# Patient Record
Sex: MALE | Race: ASIAN | Employment: OTHER | URBAN - METROPOLITAN AREA
[De-identification: names, ages, dates, MRNs, and addresses within clinical notes are randomized per-mention and may not be internally consistent; named-entity substitution may affect disease eponyms.]

---

## 2017-01-10 PROBLEM — R35.1 NOCTURIA: Status: ACTIVE | Noted: 2017-01-10

## 2017-01-10 PROBLEM — R39.15 URINARY URGENCY: Status: ACTIVE | Noted: 2017-01-10

## 2017-01-10 PROBLEM — N32.81 OAB (OVERACTIVE BLADDER): Status: ACTIVE | Noted: 2017-01-10

## 2017-01-10 PROBLEM — R35.0 URINARY FREQUENCY: Status: ACTIVE | Noted: 2017-01-10

## 2017-01-28 ENCOUNTER — APPOINTMENT (OUTPATIENT)
Dept: LAB | Age: 72
End: 2017-01-28
Attending: INTERNAL MEDICINE
Payer: MEDICAID

## 2017-01-28 DIAGNOSIS — N18.5 CHRONIC KIDNEY DISEASE, STAGE V (HCC): ICD-10-CM

## 2017-01-28 LAB
ALBUMIN SERPL BCP-MCNC: 3.6 G/DL (ref 3.5–4.8)
ANION GAP SERPL CALC-SCNC: 6 MMOL/L (ref 0–18)
BUN SERPL-MCNC: 45 MG/DL (ref 8–20)
BUN/CREAT SERPL: 8.7 (ref 10–20)
CALCIUM SERPL-MCNC: 8.9 MG/DL (ref 8.5–10.5)
CHLORIDE SERPL-SCNC: 113 MMOL/L (ref 95–110)
CO2 SERPL-SCNC: 23 MMOL/L (ref 22–32)
CREAT SERPL-MCNC: 5.15 MG/DL (ref 0.5–1.5)
GLUCOSE SERPL-MCNC: 165 MG/DL (ref 70–99)
HCT VFR BLD AUTO: 27.6 % (ref 41–52)
HGB BLD-MCNC: 8.9 G/DL (ref 13.5–17.5)
OSMOLALITY UR CALC.SUM OF ELEC: 309 MOSM/KG (ref 275–295)
PHOSPHATE SERPL-MCNC: 4.1 MG/DL (ref 2.4–4.7)
POTASSIUM SERPL-SCNC: 5 MMOL/L (ref 3.3–5.1)
SODIUM SERPL-SCNC: 142 MMOL/L (ref 136–144)

## 2017-01-28 PROCEDURE — 85014 HEMATOCRIT: CPT

## 2017-01-28 PROCEDURE — 36415 COLL VENOUS BLD VENIPUNCTURE: CPT

## 2017-01-28 PROCEDURE — 80069 RENAL FUNCTION PANEL: CPT

## 2017-01-28 PROCEDURE — 85018 HEMOGLOBIN: CPT

## 2017-02-20 ENCOUNTER — LAB ENCOUNTER (OUTPATIENT)
Dept: LAB | Age: 72
End: 2017-02-20
Attending: INTERNAL MEDICINE
Payer: MEDICAID

## 2017-02-20 DIAGNOSIS — E78.5 HYPERLIPIDEMIA: ICD-10-CM

## 2017-02-20 DIAGNOSIS — D63.1 ANEMIA IN CHRONIC KIDNEY DISEASE(285.21): ICD-10-CM

## 2017-02-20 DIAGNOSIS — E87.5 HYPERKALEMIA: ICD-10-CM

## 2017-02-20 DIAGNOSIS — N18.9 ANEMIA IN CHRONIC KIDNEY DISEASE(285.21): ICD-10-CM

## 2017-02-20 DIAGNOSIS — E11.21 TYPE II DIABETES MELLITUS WITH NEPHROPATHY (HCC): ICD-10-CM

## 2017-02-20 DIAGNOSIS — E87.2 METABOLIC ACIDOSIS: ICD-10-CM

## 2017-02-20 DIAGNOSIS — N18.5 CHRONIC KIDNEY DISEASE (CKD), STAGE 5: Primary | ICD-10-CM

## 2017-02-20 LAB
ANION GAP SERPL CALC-SCNC: 7 MMOL/L (ref 0–18)
BUN SERPL-MCNC: 41 MG/DL (ref 8–20)
BUN/CREAT SERPL: 7.7 (ref 10–20)
CALCIUM SERPL-MCNC: 9 MG/DL (ref 8.5–10.5)
CHLORIDE SERPL-SCNC: 116 MMOL/L (ref 95–110)
CO2 SERPL-SCNC: 21 MMOL/L (ref 22–32)
CREAT SERPL-MCNC: 5.3 MG/DL (ref 0.5–1.5)
FERRITIN SERPL IA-MCNC: 94 NG/ML (ref 24–336)
GLUCOSE SERPL-MCNC: 48 MG/DL (ref 70–99)
HCT VFR BLD AUTO: 27.6 % (ref 41–52)
HGB BLD-MCNC: 8.9 G/DL (ref 13.5–17.5)
IRON SATN MFR SERPL: 22 % (ref 20–55)
IRON SERPL-MCNC: 47 MCG/DL (ref 45–182)
OSMOLALITY UR CALC.SUM OF ELEC: 305 MOSM/KG (ref 275–295)
POTASSIUM SERPL-SCNC: 5.6 MMOL/L (ref 3.3–5.1)
SODIUM SERPL-SCNC: 144 MMOL/L (ref 136–144)
TIBC SERPL-MCNC: 215 MCG/DL (ref 228–428)
TRANSFERRIN SERPL-MCNC: 163 MG/DL (ref 180–329)

## 2017-02-20 PROCEDURE — 85018 HEMOGLOBIN: CPT

## 2017-02-20 PROCEDURE — 84466 ASSAY OF TRANSFERRIN: CPT

## 2017-02-20 PROCEDURE — 80048 BASIC METABOLIC PNL TOTAL CA: CPT

## 2017-02-20 PROCEDURE — 82728 ASSAY OF FERRITIN: CPT

## 2017-02-20 PROCEDURE — 36415 COLL VENOUS BLD VENIPUNCTURE: CPT

## 2017-02-20 PROCEDURE — 83540 ASSAY OF IRON: CPT

## 2017-02-20 PROCEDURE — 85014 HEMATOCRIT: CPT

## 2017-02-25 ENCOUNTER — APPOINTMENT (OUTPATIENT)
Dept: LAB | Age: 72
End: 2017-02-25
Attending: INTERNAL MEDICINE
Payer: MEDICAID

## 2017-02-25 DIAGNOSIS — E87.5 HYPERKALEMIA: ICD-10-CM

## 2017-02-25 LAB — POTASSIUM SERPL-SCNC: 5.5 MMOL/L (ref 3.3–5.1)

## 2017-02-25 PROCEDURE — 36415 COLL VENOUS BLD VENIPUNCTURE: CPT

## 2017-02-25 PROCEDURE — 84132 ASSAY OF SERUM POTASSIUM: CPT

## 2017-03-09 ENCOUNTER — APPOINTMENT (OUTPATIENT)
Dept: LAB | Age: 72
End: 2017-03-09
Attending: INTERNAL MEDICINE
Payer: MEDICAID

## 2017-03-09 DIAGNOSIS — E87.5 HYPERKALEMIA: ICD-10-CM

## 2017-03-09 LAB — POTASSIUM SERPL-SCNC: 4.1 MMOL/L (ref 3.3–5.1)

## 2017-03-09 PROCEDURE — 84132 ASSAY OF SERUM POTASSIUM: CPT

## 2017-03-09 PROCEDURE — 36415 COLL VENOUS BLD VENIPUNCTURE: CPT

## 2017-04-01 ENCOUNTER — APPOINTMENT (OUTPATIENT)
Dept: LAB | Age: 72
End: 2017-04-01
Attending: INTERNAL MEDICINE
Payer: MEDICAID

## 2017-04-01 DIAGNOSIS — N18.9 ANEMIA IN CKD (CHRONIC KIDNEY DISEASE): ICD-10-CM

## 2017-04-01 DIAGNOSIS — D63.1 ANEMIA IN CKD (CHRONIC KIDNEY DISEASE): ICD-10-CM

## 2017-04-01 DIAGNOSIS — N18.5 CHRONIC KIDNEY DISEASE, STAGE V (HCC): ICD-10-CM

## 2017-04-01 PROCEDURE — 85014 HEMATOCRIT: CPT

## 2017-04-01 PROCEDURE — 36415 COLL VENOUS BLD VENIPUNCTURE: CPT

## 2017-04-01 PROCEDURE — 85018 HEMOGLOBIN: CPT

## 2017-04-01 PROCEDURE — 80069 RENAL FUNCTION PANEL: CPT

## 2017-04-05 PROBLEM — N18.5 CHRONIC KIDNEY DISEASE, STAGE V (HCC): Status: ACTIVE | Noted: 2017-04-05

## 2017-04-05 PROBLEM — N18.9 ANEMIA IN CHRONIC KIDNEY DISEASE(285.21): Status: ACTIVE | Noted: 2017-04-05

## 2017-04-05 PROBLEM — E87.2 METABOLIC ACIDOSIS: Status: ACTIVE | Noted: 2017-04-05

## 2017-04-05 PROBLEM — E87.20 METABOLIC ACIDOSIS: Status: ACTIVE | Noted: 2017-04-05

## 2017-04-05 PROBLEM — D63.1 ANEMIA IN CHRONIC KIDNEY DISEASE(285.21): Status: ACTIVE | Noted: 2017-04-05

## 2017-04-12 ENCOUNTER — TELEPHONE (OUTPATIENT)
Dept: HEMATOLOGY/ONCOLOGY | Facility: HOSPITAL | Age: 72
End: 2017-04-12

## 2017-04-13 ENCOUNTER — APPOINTMENT (OUTPATIENT)
Dept: GENERAL RADIOLOGY | Facility: HOSPITAL | Age: 72
DRG: 233 | End: 2017-04-13
Attending: EMERGENCY MEDICINE
Payer: MEDICAID

## 2017-04-13 ENCOUNTER — HOSPITAL ENCOUNTER (INPATIENT)
Facility: HOSPITAL | Age: 72
LOS: 14 days | Discharge: NURSING, REHAB OR LONG-TERM CARE | DRG: 233 | End: 2017-04-28
Attending: EMERGENCY MEDICINE | Admitting: INTERNAL MEDICINE
Payer: MEDICAID

## 2017-04-13 DIAGNOSIS — I25.110 ATHEROSCLEROSIS OF NATIVE CORONARY ARTERY OF NATIVE HEART WITH UNSTABLE ANGINA PECTORIS (HCC): Primary | ICD-10-CM

## 2017-04-13 PROCEDURE — 96375 TX/PRO/DX INJ NEW DRUG ADDON: CPT

## 2017-04-13 PROCEDURE — 80061 LIPID PANEL: CPT | Performed by: EMERGENCY MEDICINE

## 2017-04-13 PROCEDURE — 93005 ELECTROCARDIOGRAM TRACING: CPT

## 2017-04-13 PROCEDURE — 85025 COMPLETE CBC W/AUTO DIFF WBC: CPT | Performed by: EMERGENCY MEDICINE

## 2017-04-13 PROCEDURE — 93010 ELECTROCARDIOGRAM REPORT: CPT | Performed by: EMERGENCY MEDICINE

## 2017-04-13 PROCEDURE — 71020 XR CHEST PA + LAT CHEST (CPT=71020): CPT

## 2017-04-13 PROCEDURE — 85730 THROMBOPLASTIN TIME PARTIAL: CPT | Performed by: EMERGENCY MEDICINE

## 2017-04-13 PROCEDURE — 99285 EMERGENCY DEPT VISIT HI MDM: CPT

## 2017-04-13 PROCEDURE — 85610 PROTHROMBIN TIME: CPT | Performed by: EMERGENCY MEDICINE

## 2017-04-13 PROCEDURE — 96365 THER/PROPH/DIAG IV INF INIT: CPT

## 2017-04-13 PROCEDURE — 83880 ASSAY OF NATRIURETIC PEPTIDE: CPT | Performed by: EMERGENCY MEDICINE

## 2017-04-13 PROCEDURE — 80048 BASIC METABOLIC PNL TOTAL CA: CPT | Performed by: EMERGENCY MEDICINE

## 2017-04-13 PROCEDURE — 84484 ASSAY OF TROPONIN QUANT: CPT | Performed by: EMERGENCY MEDICINE

## 2017-04-13 RX ORDER — ISOSORBIDE MONONITRATE 20 MG/1
20 TABLET ORAL DAILY
COMMUNITY

## 2017-04-13 RX ORDER — LEVOTHYROXINE SODIUM 0.03 MG/1
25 TABLET ORAL
COMMUNITY

## 2017-04-13 RX ORDER — ESOMEPRAZOLE MAGNESIUM 40 MG/1
40 CAPSULE, DELAYED RELEASE ORAL
COMMUNITY

## 2017-04-13 RX ORDER — TAMSULOSIN HYDROCHLORIDE 0.4 MG/1
0.4 CAPSULE ORAL DAILY
COMMUNITY

## 2017-04-13 RX ORDER — FOLIC ACID/VIT B COMPLEX AND C 0.8 MG
0.8 TABLET ORAL DAILY
COMMUNITY

## 2017-04-13 RX ORDER — CARVEDILOL 6.25 MG/1
6.25 TABLET ORAL DAILY
Status: ON HOLD | COMMUNITY
End: 2017-04-28

## 2017-04-14 ENCOUNTER — APPOINTMENT (OUTPATIENT)
Dept: CV DIAGNOSTICS | Facility: HOSPITAL | Age: 72
DRG: 233 | End: 2017-04-14
Attending: INTERNAL MEDICINE
Payer: MEDICAID

## 2017-04-14 PROBLEM — N18.9 RENAL FAILURE (ARF), ACUTE ON CHRONIC (HCC): Status: ACTIVE | Noted: 2017-04-14

## 2017-04-14 PROBLEM — N17.9 RENAL FAILURE (ARF), ACUTE ON CHRONIC: Status: ACTIVE | Noted: 2017-04-14

## 2017-04-14 PROBLEM — R07.9 ACUTE CHEST PAIN: Status: ACTIVE | Noted: 2017-04-14

## 2017-04-14 PROBLEM — I50.9 ACUTE ON CHRONIC CONGESTIVE HEART FAILURE, UNSPECIFIED CONGESTIVE HEART FAILURE TYPE: Status: ACTIVE | Noted: 2017-04-14

## 2017-04-14 PROBLEM — N18.9 RENAL FAILURE (ARF), ACUTE ON CHRONIC: Status: ACTIVE | Noted: 2017-04-14

## 2017-04-14 PROBLEM — D64.9 ANEMIA: Status: ACTIVE | Noted: 2017-04-14

## 2017-04-14 PROBLEM — R73.9 HYPERGLYCEMIA: Status: ACTIVE | Noted: 2017-04-14

## 2017-04-14 PROBLEM — R06.09 DYSPNEA ON EXERTION: Status: ACTIVE | Noted: 2017-04-14

## 2017-04-14 PROBLEM — R06.00 DYSPNEA ON EXERTION: Status: ACTIVE | Noted: 2017-04-14

## 2017-04-14 PROBLEM — N17.9 RENAL FAILURE (ARF), ACUTE ON CHRONIC (HCC): Status: ACTIVE | Noted: 2017-04-14

## 2017-04-14 PROCEDURE — 93306 TTE W/DOPPLER COMPLETE: CPT

## 2017-04-14 PROCEDURE — 3E033PZ INTRODUCTION OF PLATELET INHIBITOR INTO PERIPHERAL VEIN, PERCUTANEOUS APPROACH: ICD-10-PCS | Performed by: INTERNAL MEDICINE

## 2017-04-14 PROCEDURE — 84484 ASSAY OF TROPONIN QUANT: CPT | Performed by: EMERGENCY MEDICINE

## 2017-04-14 PROCEDURE — 93005 ELECTROCARDIOGRAM TRACING: CPT

## 2017-04-14 PROCEDURE — 93010 ELECTROCARDIOGRAM REPORT: CPT | Performed by: EMERGENCY MEDICINE

## 2017-04-14 PROCEDURE — 82962 GLUCOSE BLOOD TEST: CPT

## 2017-04-14 PROCEDURE — 83036 HEMOGLOBIN GLYCOSYLATED A1C: CPT | Performed by: INTERNAL MEDICINE

## 2017-04-14 PROCEDURE — 93010 ELECTROCARDIOGRAM REPORT: CPT | Performed by: INTERNAL MEDICINE

## 2017-04-14 PROCEDURE — 84484 ASSAY OF TROPONIN QUANT: CPT | Performed by: INTERNAL MEDICINE

## 2017-04-14 PROCEDURE — 80048 BASIC METABOLIC PNL TOTAL CA: CPT | Performed by: INTERNAL MEDICINE

## 2017-04-14 PROCEDURE — 93306 TTE W/DOPPLER COMPLETE: CPT | Performed by: INTERNAL MEDICINE

## 2017-04-14 PROCEDURE — 85730 THROMBOPLASTIN TIME PARTIAL: CPT | Performed by: INTERNAL MEDICINE

## 2017-04-14 PROCEDURE — 85025 COMPLETE CBC W/AUTO DIFF WBC: CPT | Performed by: INTERNAL MEDICINE

## 2017-04-14 PROCEDURE — 80061 LIPID PANEL: CPT | Performed by: INTERNAL MEDICINE

## 2017-04-14 RX ORDER — FUROSEMIDE 10 MG/ML
40 INJECTION INTRAMUSCULAR; INTRAVENOUS ONCE
Status: COMPLETED | OUTPATIENT
Start: 2017-04-14 | End: 2017-04-14

## 2017-04-14 RX ORDER — LEVOTHYROXINE SODIUM 0.03 MG/1
25 TABLET ORAL
Status: DISCONTINUED | OUTPATIENT
Start: 2017-04-14 | End: 2017-04-28

## 2017-04-14 RX ORDER — NITROGLYCERIN 2.5 MG/D
1 PATCH TRANSDERMAL DAILY
Status: DISCONTINUED | OUTPATIENT
Start: 2017-04-14 | End: 2017-04-28

## 2017-04-14 RX ORDER — FUROSEMIDE 40 MG/1
40 TABLET ORAL
Status: DISCONTINUED | OUTPATIENT
Start: 2017-04-14 | End: 2017-04-25

## 2017-04-14 RX ORDER — CETIRIZINE HYDROCHLORIDE 5 MG/1
TABLET ORAL DAILY
COMMUNITY

## 2017-04-14 RX ORDER — EPTIFIBATIDE 0.75 MG/ML
1 INJECTION, SOLUTION INTRAVENOUS CONTINUOUS
Status: DISCONTINUED | OUTPATIENT
Start: 2017-04-14 | End: 2017-04-16

## 2017-04-14 RX ORDER — FOLIC ACID/VIT B COMPLEX AND C 0.8 MG
1 TABLET ORAL DAILY
Status: DISCONTINUED | OUTPATIENT
Start: 2017-04-14 | End: 2017-04-28

## 2017-04-14 RX ORDER — FUROSEMIDE 40 MG/1
40 TABLET ORAL DAILY
Status: DISCONTINUED | OUTPATIENT
Start: 2017-04-14 | End: 2017-04-14

## 2017-04-14 RX ORDER — HEPARIN SODIUM 1000 [USP'U]/ML
60 INJECTION, SOLUTION INTRAVENOUS; SUBCUTANEOUS ONCE
Status: DISCONTINUED | OUTPATIENT
Start: 2017-04-14 | End: 2017-04-14

## 2017-04-14 RX ORDER — PANTOPRAZOLE SODIUM 40 MG/1
40 TABLET, DELAYED RELEASE ORAL
Status: DISCONTINUED | OUTPATIENT
Start: 2017-04-14 | End: 2017-04-24

## 2017-04-14 RX ORDER — HEPARIN SODIUM AND DEXTROSE 10000; 5 [USP'U]/100ML; G/100ML
12 INJECTION INTRAVENOUS ONCE
Status: DISCONTINUED | OUTPATIENT
Start: 2017-04-14 | End: 2017-04-14

## 2017-04-14 RX ORDER — ASPIRIN 81 MG/1
324 TABLET, CHEWABLE ORAL ONCE
Status: COMPLETED | OUTPATIENT
Start: 2017-04-14 | End: 2017-04-14

## 2017-04-14 RX ORDER — AMLODIPINE BESYLATE 10 MG/1
10 TABLET ORAL DAILY
Status: DISCONTINUED | OUTPATIENT
Start: 2017-04-15 | End: 2017-04-28

## 2017-04-14 RX ORDER — ALFUZOSIN HYDROCHLORIDE 10 MG/1
10 TABLET, EXTENDED RELEASE ORAL
Status: DISCONTINUED | OUTPATIENT
Start: 2017-04-14 | End: 2017-04-28

## 2017-04-14 RX ORDER — DEXTROSE MONOHYDRATE 25 G/50ML
50 INJECTION, SOLUTION INTRAVENOUS AS NEEDED
Status: DISCONTINUED | OUTPATIENT
Start: 2017-04-14 | End: 2017-04-14

## 2017-04-14 RX ORDER — HEPARIN SODIUM AND DEXTROSE 10000; 5 [USP'U]/100ML; G/100ML
INJECTION INTRAVENOUS CONTINUOUS
Status: DISCONTINUED | OUTPATIENT
Start: 2017-04-14 | End: 2017-04-16

## 2017-04-14 RX ORDER — CARVEDILOL 6.25 MG/1
6.25 TABLET ORAL DAILY
Status: DISCONTINUED | OUTPATIENT
Start: 2017-04-14 | End: 2017-04-14

## 2017-04-14 RX ORDER — HEPARIN SODIUM AND DEXTROSE 10000; 5 [USP'U]/100ML; G/100ML
INJECTION INTRAVENOUS CONTINUOUS
Status: DISCONTINUED | OUTPATIENT
Start: 2017-04-14 | End: 2017-04-14

## 2017-04-14 RX ORDER — HEPARIN SODIUM 1000 [USP'U]/ML
60 INJECTION, SOLUTION INTRAVENOUS; SUBCUTANEOUS ONCE
Status: COMPLETED | OUTPATIENT
Start: 2017-04-14 | End: 2017-04-14

## 2017-04-14 RX ORDER — DEXTROSE MONOHYDRATE 25 G/50ML
50 INJECTION, SOLUTION INTRAVENOUS AS NEEDED
Status: DISCONTINUED | OUTPATIENT
Start: 2017-04-14 | End: 2017-04-28

## 2017-04-14 RX ORDER — CARVEDILOL 12.5 MG/1
12.5 TABLET ORAL DAILY
Status: DISCONTINUED | OUTPATIENT
Start: 2017-04-15 | End: 2017-04-25

## 2017-04-14 RX ORDER — HEPARIN SODIUM AND DEXTROSE 10000; 5 [USP'U]/100ML; G/100ML
12 INJECTION INTRAVENOUS ONCE
Status: COMPLETED | OUTPATIENT
Start: 2017-04-14 | End: 2017-04-14

## 2017-04-14 RX ORDER — SODIUM BICARBONATE 650 MG/1
600 TABLET ORAL 3 TIMES DAILY
Status: DISCONTINUED | OUTPATIENT
Start: 2017-04-14 | End: 2017-04-18

## 2017-04-14 NOTE — PLAN OF CARE
CARDIOVASCULAR - ADULT    • Maintains optimal cardiac output and hemodynamic stability Progressing        PAIN - ADULT    • Verbalizes/displays adequate comfort level or patient's stated pain goal Progressing        Patient Centered Care    • Patient prefe

## 2017-04-14 NOTE — CONSULTS
[facility]    Report of Consultation    Can Parnell Patient Status:  Inpatient    10/17/1945 MRN [id number]   Location [facility] 3W/SW Attending  [address] Day # 1 PCP Verneita Lennox, MD, MD     Date of Admission 25 mcg 25 mcg Oral Before breakfast   NEPHRO-LIZA (NEPHRO-LIZA) tab 0.8 mg 1 tablet Oral Daily   sodium bicarbonate tab 650 mg Oral TID   Alfuzosin HCl ER (UROXATRAL) 24 hr tab 10 mg 10 mg Oral Daily with breakfast   nitroGLYCERIN (NITRODUR) 0.1 MG/HR 1 pa Wt Readings from Last 1 Encounters:  04/14/17 : 144 lb (65.318 kg)      General appearance: alert, appears stated age , no distress  Pulmonary:  clear to auscultation bilaterally  Cardiovascular: S1, S2 normal  Abdominal:  soft, non-tender  Extremities

## 2017-04-14 NOTE — ED INITIAL ASSESSMENT (HPI)
Pt c/o sob for the past couple of days. Pt has a history of kidney problems and has been having his Hgb monitored.

## 2017-04-14 NOTE — ED PROVIDER NOTES
Patient Seen in: [facility] AND [facility] Emergency Department    History   Patient presents with:  Dyspnea DEEPA SOB (respiratory)    Stated Complaint: DEEPA    HPI    64-year-old male presents the emergency department with 10 days of worsening dyspnea.   He denies otherwise stated in HPI.     Physical Exam       ED Triage Vitals   BP 04/13/17 2302 147/75 mmHg   Pulse 04/13/17 2302 88   Resp 04/13/17 2302 20   Temp 04/13/17 2302 98.1 °F (36.7 °C)   Temp src --    SpO2 04/13/17 2302 100 %   O2 Device 04/14/17 0011 None following:     Non HDL Chol 152 (*)     LDL Cholesterol 124 (*)     All other components within normal limits   BNP (B TYPE NATRIUERTIC PEPTIDE) - Abnormal; Notable for the following:     Beta Natriuretic Peptide 754 (*)     All other components within nor heparin started in ED. D/w Dr Toby Jones and Dr Vannessa Curry. Admit to tele.      Disposition and Plan     Clinical Impression:  Acute chest pain  (primary encounter diagnosis)  Acute on chronic congestive heart failure, unspecified congestive heart failure type ([facility] [address]

## 2017-04-14 NOTE — PAYOR COMM NOTE
[facility] [place] [facility]            (For Outpatient Use Only)  Initial Admit Date:  4/13/2017    Inpt/Obs Admit Date:  Inpt: 4/14/17 / Obs: N/A    Discharge Date:  [address]:   [de-identified]   MRN:  [id number]    CSN:  [id number]      Subscriber Name:    Subscriber :      Subscriber ID:    Pt Rel to Subscriber:      TERTIARY INSURANCE    Payor:    Plan:      Group Number:    Insurance Type:      Subscriber Name:    Subscriber :      Subscriber ID:    Pt Rel to Subscriber:      Hos HISTORY OF PRESENT ILLNESS:  Information was obtained from the patient as well as son who is at the bedside who stated that the patient on the day of admission noticed heartburn on the right side of the chest, which lasted for an hour and a half to 2 hours GENERAL:  The patient is lying in the bed, head raised position without any acute distress. VITAL SIGNS:  Blood pressure 110/74, pulse is 90 per minute and regular, respirations are 18, afebrile. HEENT:  Unremarkable.   NECK:  Supple without any carotid b [facility] 3W/SW  [address], [facility] [address] Day #  1  PCP  Jackson Tinsley MD, MD        Subjective:      Constitutional: Negative for fever and fatigue.    HENT: Negative for facial swelling.    Respiratory: Negative for choking a PTT  91.5*  04/14/2017    INR  1.1  04/13/2017    PHOS  4.4  04/01/2017    TROP  1.25*  04/14/2017        Xr Chest Pa + Lat Chest (cpt=71020)    4/14/2017  CONCLUSION:         1. Borderline cardiomegaly. 2. Atherosclerosis. 3. Scarring/atelectasis.  4. Mini Cardiovascular: Regular rhythm.     No murmur heard. Pulmonary/Chest: Breath sounds normal. He has no wheezes. Abdominal: Bowel sounds are normal. There is no rebound and no guarding. Neurological: He is oriented to person, place, and time.    Skin: Sk   10/17/1945  MRN  [id number]    [facility] 3W/SW  [address] Day #  1  PCP  Ilana Estrada MD, MD      Date of Admission:  2017  Date of Consult:  17  Reason for Consultation:      Patient with advanc NEPHRO-LIZA (NEPHRO-LIZA) tab 0.8 mg  1 tablet  Oral  Daily    sodium bicarbonate tab  650 mg  Oral  TID    Alfuzosin HCl ER (UROXATRAL) 24 hr tab 10 mg  10 mg  Oral  Daily with breakfast    nitroGLYCERIN (NITRODUR) 0.1 MG/HR 1 patch  1 patch  [employer]   Gross per 24 hour    Intake     160 ml    Output       0 ml    Net     160 ml      Wt Readings from Last 1 Encounters:  04/14/17 : 144 lb (65.318 kg)      General appearance: alert, appears stated age , no distress  Pulmonary:  clear to auscultation bila Pepito Batres #[id number]  (76 year old M)       [facility] [id number]         Bulmaro Grijalva MD Physician Signed  ED Provider Notes 4/13/2017 11:54 PM      Expand All Collapse All      Patient Seen in: [facility] Emergency Department      Hi Other systems are as noted in HPI. Constitutional and vital signs reviewed.      All other systems reviewed and negative except as noted above. PSFH elements reviewed from today and agreed except as otherwise stated in HPI.       Physical Exam        ED   GFR, Non-  10 (*)        GFR, -American  12 (*)        All other components within normal limits    TROPONIN I, 0 HOUR - Abnormal; Notable for the following:       Troponin  1.07 (*)        All other components within normal limits Mild diffuse interstitial prominence, which could be chronic, but can also be seen with mild pulmonary edema or atypical infection. Cardiac/mediastinal silhouette is within normal limits.       Radiology exams  Viewed and reviewed by myself and findings di

## 2017-04-14 NOTE — PLAN OF CARE
Altered Communication/Language Barrier    • Patient/Family is able to understand and participate in their care Progressing    Patient speaks some english, primary language is Occitan. Son, Reshma Urena, translates for patient.      CARDIOVASCULAR - ADULT    • Maintain

## 2017-04-14 NOTE — H&P
[facility]    PATIENT'S NAME: Mckenzie Somers   ATTENDING PHYSICIAN: Tyler Vergara MD   PATIENT ACCOUNT#:   [id number]    LOCATION:  [address] [place] [address] RECORD #:   [id number]       YOB: 1945  ADMISSION DATE:       04/13/2 he never smoked cigarettes or drank alcohol or used any drugs. The patient is ambulatory and uses a walker sometimes, and lately has been short of breath.     REVIEW OF SYSTEMS:  The patient denied severe headache, double vision, sore throat, swallowing di

## 2017-04-14 NOTE — PROGRESS NOTES
[place] [facility]    Progress Note    Can Parnell Patient Status:  Inpatient    10/17/1945 MRN [id number]   Location [facility] 3W/SW Attending [address] Day # 1 PCP Verneita Lennox, MD, MD     Subjective:     Constitu CA 8.7 04/14/2017   ALB 3.5 04/01/2017   PTT 91.5* 04/14/2017   INR 1.1 04/13/2017   PHOS 4.4 04/01/2017   TROP 1.25* 04/14/2017       Xr Chest Pa + Lat Chest (cpt=71020)    4/14/2017  CONCLUSION:  1. Borderline cardiomegaly. 2. Atherosclerosis.  3. Scarr

## 2017-04-14 NOTE — CONSULTS
[facility]    PATIENT'S NAME: Juhi Herberth   ATTENDING PHYSICIAN: Billy Alex MD   CONSULTING PHYSICIAN: Mardene Goodpasture, MD   PATIENT ACCOUNT#:   [id number]    LOCATION:  [address] [place] [address] RECORD #:   [id number]       DATE OF BIRTH:  1 respiratory distress at the time of my interview. VITAL SIGNS:  Temperature 98.2, heart rate 70, blood pressure 143/66, O2 saturation 100% on room air. HEENT:  Head:  Atraumatic, normocephalic.   Pupils equal, round, and reactive to light and accommodatio obtain echocardiogram to evaluate ejection fraction ans wall mothion. We may consider Lexiscan stress test in 2-3 days. Patient has already been evaluated by [facility]. Thank you so much for the consultation.   We will continue to follow up with yo

## 2017-04-15 PROCEDURE — 82962 GLUCOSE BLOOD TEST: CPT

## 2017-04-15 PROCEDURE — 80061 LIPID PANEL: CPT | Performed by: INTERNAL MEDICINE

## 2017-04-15 PROCEDURE — 85730 THROMBOPLASTIN TIME PARTIAL: CPT | Performed by: INTERNAL MEDICINE

## 2017-04-15 PROCEDURE — 84100 ASSAY OF PHOSPHORUS: CPT | Performed by: INTERNAL MEDICINE

## 2017-04-15 PROCEDURE — 85025 COMPLETE CBC W/AUTO DIFF WBC: CPT | Performed by: INTERNAL MEDICINE

## 2017-04-15 PROCEDURE — 80053 COMPREHEN METABOLIC PANEL: CPT | Performed by: INTERNAL MEDICINE

## 2017-04-15 PROCEDURE — 84443 ASSAY THYROID STIM HORMONE: CPT | Performed by: INTERNAL MEDICINE

## 2017-04-15 RX ORDER — ATORVASTATIN CALCIUM 40 MG/1
40 TABLET, FILM COATED ORAL NIGHTLY
Status: DISCONTINUED | OUTPATIENT
Start: 2017-04-15 | End: 2017-04-28

## 2017-04-15 RX ORDER — 0.9 % SODIUM CHLORIDE 0.9 %
VIAL (ML) INJECTION
Status: COMPLETED
Start: 2017-04-15 | End: 2017-04-15

## 2017-04-15 NOTE — PLAN OF CARE
Altered Communication/Language Barrier    • Patient/Family is able to understand and participate in their care Progressing    Primary language is Kyrgyz. Speaks basic [address] Son, Nika Silverio, translates when needed.      CARDIOVASCULAR - ADULT    • Maintains opti

## 2017-04-15 NOTE — PROGRESS NOTES
[facility]    Progress Note    Mary Ly Patient Status:  Inpatient    10/17/1945 MRN [id number]   Location [facility] 3W/SW Attending [address] Day # 2 PCP Jose Luis Ma MD, MD       Subjective:   Shaun Bobby 20*   --   22  23          Xr Chest Pa + Lat Chest (cpt=71020)    4/14/2017  CONCLUSION:  1. Borderline cardiomegaly. 2. Atherosclerosis. 3. Scarring/atelectasis. 4. Minimal scoliosis.  5. A preliminary report was submitted and there are no major discrepanc

## 2017-04-15 NOTE — PROGRESS NOTES
Patient seen in follow up. No overnight events. Patient denies any chest pain or sob. Denies any cough. Denies abdominal pain.      04/15/17  0808   BP: 131/55   Pulse: 66   Temp: 98.4 °F (36.9 °C)   Resp: 19       Intake/Output Summary (Last 24 hours carvedilol 6.25 MG Oral Tab Take 6.25 mg by mouth daily. isosorbide mononitrate 20 MG Oral Tab Take 20 mg by mouth daily.    Levothyroxine Sodium 25 MCG Oral Tab Take 25 mcg by mouth before breakfast.   NEPHRO-LIZA 0.8 MG Oral Tab Take 0.8 mg by mouth jorge a Non-ST elevation myocardial infarction:  Patient has elevated troponin with EKG changes, definitely concerning for coronary artery disease.  I spoke with the patient in detail and with his son about my recommendation.  Patient needs to have coronary angiog

## 2017-04-16 PROCEDURE — 82728 ASSAY OF FERRITIN: CPT | Performed by: INTERNAL MEDICINE

## 2017-04-16 PROCEDURE — 84100 ASSAY OF PHOSPHORUS: CPT | Performed by: INTERNAL MEDICINE

## 2017-04-16 PROCEDURE — 85025 COMPLETE CBC W/AUTO DIFF WBC: CPT | Performed by: INTERNAL MEDICINE

## 2017-04-16 PROCEDURE — 80053 COMPREHEN METABOLIC PANEL: CPT | Performed by: INTERNAL MEDICINE

## 2017-04-16 PROCEDURE — 83540 ASSAY OF IRON: CPT | Performed by: INTERNAL MEDICINE

## 2017-04-16 PROCEDURE — 82962 GLUCOSE BLOOD TEST: CPT

## 2017-04-16 PROCEDURE — 84466 ASSAY OF TRANSFERRIN: CPT | Performed by: INTERNAL MEDICINE

## 2017-04-16 PROCEDURE — 85730 THROMBOPLASTIN TIME PARTIAL: CPT | Performed by: INTERNAL MEDICINE

## 2017-04-16 NOTE — PLAN OF CARE
Altered Communication/Language Barrier    • Patient/Family is able to understand and participate in their care Progressing    English second language     CARDIOVASCULAR - ADULT    • Maintains optimal cardiac output and hemodynamic stability Progressing

## 2017-04-16 NOTE — PLAN OF CARE
Problem: Diabetes/Glucose Control  Goal: Glucose maintained within prescribed range  INTERVENTIONS:  - Monitor Blood Glucose as ordered  - Assess for signs and symptoms of hyperglycemia and hypoglycemia  - Administer ordered medications to maintain glucose optimize hemodynamic stability  - Monitor arterial and/or venous puncture sites for bleeding and/or hematoma  - Assess quality of pulses, skin color and temperature  - Assess for signs of decreased coronary artery perfusion - ex.  Angina  - Evaluate fluid b anxiety  - Utilize distraction and/or relaxation techniques  - Monitor for opioid side effects  - Notify MD/LIP if interventions unsuccessful or patient reports new pain   Outcome: Progressing    Problem: SAFETY ADULT - FALL  Goal: Free from fall injury  I

## 2017-04-16 NOTE — PROGRESS NOTES
[facility]    Progress Note    Mary Macias Patient Status:  Inpatient    10/17/1945 MRN [id number]   Location [facility] 3W/SW Attending [address] Day # 3 PCP Jose Luis Ma MD, MD       Subjective:   Shaun Bobby Wendy Pizarro MD  4/16/2017

## 2017-04-16 NOTE — PROGRESS NOTES
[facility]    Progress Note    Julien Abdalla Patient Status:  Inpatient    10/17/1945 MRN [id number]   Location [facility] 3W/SW Attending [address] Day # 2 PCP Sandrita Alfred MD, MD     Subjective:   Subjective Results:     Lab Results  Component Value Date   WBC 7.5 04/15/2017   HGB 7.4* 04/15/2017   HCT 22.4* 04/15/2017    04/15/2017   CREATSERUM 5.59* 04/15/2017   BUN 52* 04/15/2017    04/15/2017   K 5.1 04/15/2017    04/15/2017

## 2017-04-16 NOTE — PROGRESS NOTES
Patient seen in follow up. No overnight events. Patient denies any chest pain or sob. Denies any cough. Denies abdominal pain.   C/o nasal bleed   04/16/17  0800   BP: 115/55   Pulse: 58   Temp: 98.3 °F (36.8 °C)   Resp: 17       Intake/Output [place] carvedilol 6.25 MG Oral Tab Take 6.25 mg by mouth daily. isosorbide mononitrate 20 MG Oral Tab Take 20 mg by mouth daily.    Levothyroxine Sodium 25 MCG Oral Tab Take 25 mcg by mouth before breakfast.   NEPHRO-LIZA 0.8 MG Oral Tab Take 0.8 mg by mouth jorge a Non-ST elevation myocardial infarction:  Patient has elevated troponin with EKG changes, definitely concerning for coronary artery disease.  I spoke with the patient in detail and with his son about my recommendation.  Patient needs to have coronary angiog

## 2017-04-17 ENCOUNTER — APPOINTMENT (OUTPATIENT)
Dept: CV DIAGNOSTICS | Facility: HOSPITAL | Age: 72
DRG: 233 | End: 2017-04-17
Attending: INTERNAL MEDICINE
Payer: MEDICAID

## 2017-04-17 ENCOUNTER — APPOINTMENT (OUTPATIENT)
Dept: NUCLEAR MEDICINE | Facility: HOSPITAL | Age: 72
DRG: 233 | End: 2017-04-17
Attending: INTERNAL MEDICINE
Payer: MEDICAID

## 2017-04-17 PROCEDURE — 80069 RENAL FUNCTION PANEL: CPT | Performed by: INTERNAL MEDICINE

## 2017-04-17 PROCEDURE — 93017 CV STRESS TEST TRACING ONLY: CPT

## 2017-04-17 PROCEDURE — 84484 ASSAY OF TROPONIN QUANT: CPT | Performed by: INTERNAL MEDICINE

## 2017-04-17 PROCEDURE — 78451 HT MUSCLE IMAGE SPECT SING: CPT

## 2017-04-17 PROCEDURE — 85730 THROMBOPLASTIN TIME PARTIAL: CPT | Performed by: INTERNAL MEDICINE

## 2017-04-17 PROCEDURE — 85025 COMPLETE CBC W/AUTO DIFF WBC: CPT | Performed by: INTERNAL MEDICINE

## 2017-04-17 PROCEDURE — 82962 GLUCOSE BLOOD TEST: CPT

## 2017-04-17 PROCEDURE — 78452 HT MUSCLE IMAGE SPECT MULT: CPT

## 2017-04-17 RX ORDER — 0.9 % SODIUM CHLORIDE 0.9 %
VIAL (ML) INJECTION
Status: DISCONTINUED
Start: 2017-04-17 | End: 2017-04-17 | Stop reason: WASHOUT

## 2017-04-17 RX ORDER — 0.9 % SODIUM CHLORIDE 0.9 %
VIAL (ML) INJECTION
Status: COMPLETED
Start: 2017-04-17 | End: 2017-04-17

## 2017-04-17 NOTE — PROGRESS NOTES
Patient seen in follow up. No overnight events. Patient denies any chest pain or sob. Denies any cough. Denies abdominal pain.   C/o nasal bleed   04/17/17  0900   BP:    Pulse: 67   Temp:    Resp:        Intake/Output Summary (Last 24 hours) at 04/ carvedilol 6.25 MG Oral Tab Take 6.25 mg by mouth daily. isosorbide mononitrate 20 MG Oral Tab Take 20 mg by mouth daily.    Levothyroxine Sodium 25 MCG Oral Tab Take 25 mcg by mouth before breakfast.   NEPHRO-LIZA 0.8 MG Oral Tab Take 0.8 mg by mouth jorge a Non-ST elevation myocardial infarction:  Patient has elevated troponin with EKG changes, definitely concerning for coronary artery disease.  I spoke with the patient in detail and with his son about my recommendation.  Patient needs to have coronary angiog

## 2017-04-17 NOTE — IMAGING NOTE
Patient is here for Regadenoson stress test. Baseline ECG showed more inverted T waves in inferior leads( changed from the last 3 ECG done on this admission. Patient denies any chest discomfort at this time. Dr Zaida Tapia (cardiologist)called and he came in to r

## 2017-04-17 NOTE — PROGRESS NOTES
[place] [facility] - [facility]    Progress Note    Tonny Kong Patient Status:  Inpatient    10/17/1945 MRN [id number]   Location [facility] 3W/SW Attending  [address] Day # 4 PCP Radha Hernandez MD, MD       Subjective:   Yuriy Camera Lidia Ramirez MD  4/17/2017

## 2017-04-18 ENCOUNTER — APPOINTMENT (OUTPATIENT)
Dept: HEMATOLOGY/ONCOLOGY | Facility: HOSPITAL | Age: 72
End: 2017-04-18
Attending: INTERNAL MEDICINE
Payer: MEDICAID

## 2017-04-18 PROCEDURE — 80048 BASIC METABOLIC PNL TOTAL CA: CPT | Performed by: INTERNAL MEDICINE

## 2017-04-18 PROCEDURE — 86920 COMPATIBILITY TEST SPIN: CPT

## 2017-04-18 PROCEDURE — 82962 GLUCOSE BLOOD TEST: CPT

## 2017-04-18 PROCEDURE — 85018 HEMOGLOBIN: CPT | Performed by: INTERNAL MEDICINE

## 2017-04-18 PROCEDURE — 86901 BLOOD TYPING SEROLOGIC RH(D): CPT | Performed by: INTERNAL MEDICINE

## 2017-04-18 PROCEDURE — 86900 BLOOD TYPING SEROLOGIC ABO: CPT | Performed by: INTERNAL MEDICINE

## 2017-04-18 PROCEDURE — 86850 RBC ANTIBODY SCREEN: CPT | Performed by: INTERNAL MEDICINE

## 2017-04-18 RX ORDER — SODIUM POLYSTYRENE SULFONATE 15 G/60ML
30 SUSPENSION ORAL; RECTAL ONCE
Status: COMPLETED | OUTPATIENT
Start: 2017-04-18 | End: 2017-04-18

## 2017-04-18 RX ORDER — SODIUM CHLORIDE 9 MG/ML
INJECTION, SOLUTION INTRAVENOUS CONTINUOUS
Status: DISCONTINUED | OUTPATIENT
Start: 2017-04-19 | End: 2017-04-19

## 2017-04-18 RX ORDER — ASPIRIN 81 MG/1
324 TABLET, CHEWABLE ORAL ONCE
Status: COMPLETED | OUTPATIENT
Start: 2017-04-19 | End: 2017-04-19

## 2017-04-18 RX ORDER — SODIUM CHLORIDE 9 MG/ML
INJECTION, SOLUTION INTRAVENOUS CONTINUOUS
Status: DISCONTINUED | OUTPATIENT
Start: 2017-04-18 | End: 2017-04-18

## 2017-04-18 RX ORDER — ALBUMIN (HUMAN) 12.5 G/50ML
100 SOLUTION INTRAVENOUS AS NEEDED
Status: DISCONTINUED | OUTPATIENT
Start: 2017-04-19 | End: 2017-04-25

## 2017-04-18 RX ORDER — CHLORHEXIDINE GLUCONATE 4 G/100ML
30 SOLUTION TOPICAL
Status: COMPLETED | OUTPATIENT
Start: 2017-04-19 | End: 2017-04-19

## 2017-04-18 RX ORDER — 0.9 % SODIUM CHLORIDE 0.9 %
VIAL (ML) INJECTION
Status: COMPLETED
Start: 2017-04-18 | End: 2017-04-18

## 2017-04-18 NOTE — PROGRESS NOTES
[facility]    Progress Note    Ivana Mcdonald Patient Status:  Inpatient    10/17/1945 MRN [id number]   Location [facility] 3W/SW Attending [address] Day # 5 PCP Osmar Cabral MD, MD     Subjective:   Subjective 7.3* 04/17/2017   HCT 22.0* 04/17/2017    04/17/2017   CREATSERUM 5.93* 04/17/2017   BUN 61* 04/17/2017    04/17/2017   K 5.3* 04/17/2017    04/17/2017   CO2 23 04/17/2017   GLU 90 04/17/2017   CA 8.7 04/17/2017   ALB 3.2* 04/17/2017   A

## 2017-04-18 NOTE — PLAN OF CARE
HEMATOLOGIC - ADULT    • Maintains hematologic stability Not Progressing        METABOLIC/FLUID AND ELECTROLYTES - ADULT    • Electrolytes maintained within normal limits Not Progressing    • Hemodynamic stability and optimal renal function maintained Not

## 2017-04-18 NOTE — PROGRESS NOTES
Patient seen in follow up. No overnight events. Patient denies any chest pain or sob. Denies any cough. Denies abdominal pain.       04/18/17  0746   BP: 142/74   Pulse: 64   Temp: 98.1 °F (36.7 °C)   Resp: 16       Intake/Output Summary (Last 24 ho Calcium-Phosphorus-Vitamin D (CITRACAL +D3 OR) Take 1 tablet by mouth daily. carvedilol 6.25 MG Oral Tab Take 6.25 mg by mouth daily. isosorbide mononitrate 20 MG Oral Tab Take 20 mg by mouth daily.    Levothyroxine Sodium 25 MCG Oral Tab Take 25 mcg by Non-ST elevation myocardial infarction:  Patient has elevated troponin with EKG changes, definitely concerning for coronary artery disease.  I spoke with the patient in detail and with his son about my recommendation.  Patient needs to have coronary angiog

## 2017-04-18 NOTE — PLAN OF CARE
Dialysis scheduled for 4/19/17. Spoke with Lamona.  Inquired that HD will be planned for afternoon after scheduled angiogram at 10am

## 2017-04-18 NOTE — PROGRESS NOTES
[facility]    Progress Note    Tacey Alpers Patient Status:  Inpatient    10/17/1945 MRN [id number]   Location [facility] 3W/SW Attending  [address] Day # 5 PCP Jackson Tinsley MD, MD       Subjective:   Yoav Gannon 1.1 04/13/2017   TSH 2.81 04/15/2017   PHOS 4.9* 04/17/2017   TROP 0.72* 04/17/2017                   Pop Watt MD  4/18/2017

## 2017-04-18 NOTE — CARDIAC REHAB
Cardiac Rehab Phase I    Activity:  Distance   Assistance needed   Patient tolerated activity . Education:  Handouts provided and reviewed: [address] Diet: Healthy Cardiac diet reviewed.     Disease Process: Disease process rev

## 2017-04-18 NOTE — PROGRESS NOTES
[facility]    [facility] Note    Bairon Johns Patient Status:  Inpatient    10/17/1945 MRN [id number]   Location [facility] 3W/SW Attending [address] Day # 4 PCP Carlos Silverio MD, MD     Subjective:   Subjective 7.3* 04/17/2017   HCT 22.0* 04/17/2017    04/17/2017   CREATSERUM 5.93* 04/17/2017   BUN 61* 04/17/2017    04/17/2017   K 5.3* 04/17/2017    04/17/2017   CO2 23 04/17/2017   GLU 90 04/17/2017   CA 8.7 04/17/2017   ALB 3.2* 04/17/2017   A

## 2017-04-18 NOTE — PAYOR COMM NOTE
CLINICAL UPDATE    Johny Cuevas #[id number]  (76 year old M)       [facility] [id number]         Elisha Noble MD Physician Signed  Progress Notes 4/18/2017  8:52 AM      Expand All Collapse All          Patient seen in follow up. No overnight events. epoetin rosendo (EPOGEN,PROCRIT) 63641 UNIT/ML injection 10,000 Units  10,000 Units  Subcutaneous  Weekly    furosemide (LASIX) tab 40 mg  40 mg  Oral  BID (Diuretic)    AmLODIPine Besylate (NORVASC) tab 10 mg  10 mg  Oral  Daily    carvedilol (COREG) tab 12. Non-ST elevation myocardial infarction:  Patient has elevated troponin with EKG changes, definitely concerning for coronary artery disease.  I spoke with the patient in detail and with his son about my recommendation.  Patient needs to have coronary angiog

## 2017-04-19 ENCOUNTER — APPOINTMENT (OUTPATIENT)
Dept: INTERVENTIONAL RADIOLOGY/VASCULAR | Facility: HOSPITAL | Age: 72
DRG: 233 | End: 2017-04-19
Attending: INTERNAL MEDICINE
Payer: MEDICAID

## 2017-04-19 PROCEDURE — 86706 HEP B SURFACE ANTIBODY: CPT | Performed by: INTERNAL MEDICINE

## 2017-04-19 PROCEDURE — 93458 L HRT ARTERY/VENTRICLE ANGIO: CPT

## 2017-04-19 PROCEDURE — 93005 ELECTROCARDIOGRAM TRACING: CPT

## 2017-04-19 PROCEDURE — 80053 COMPREHEN METABOLIC PANEL: CPT | Performed by: INTERNAL MEDICINE

## 2017-04-19 PROCEDURE — 85610 PROTHROMBIN TIME: CPT | Performed by: INTERNAL MEDICINE

## 2017-04-19 PROCEDURE — 4A023N7 MEASUREMENT OF CARDIAC SAMPLING AND PRESSURE, LEFT HEART, PERCUTANEOUS APPROACH: ICD-10-PCS | Performed by: INTERNAL MEDICINE

## 2017-04-19 PROCEDURE — 93010 ELECTROCARDIOGRAM REPORT: CPT | Performed by: INTERNAL MEDICINE

## 2017-04-19 PROCEDURE — 85025 COMPLETE CBC W/AUTO DIFF WBC: CPT | Performed by: INTERNAL MEDICINE

## 2017-04-19 PROCEDURE — B2111ZZ FLUOROSCOPY OF MULTIPLE CORONARY ARTERIES USING LOW OSMOLAR CONTRAST: ICD-10-PCS | Performed by: INTERNAL MEDICINE

## 2017-04-19 PROCEDURE — 86704 HEP B CORE ANTIBODY TOTAL: CPT | Performed by: INTERNAL MEDICINE

## 2017-04-19 PROCEDURE — 5A1D60Z PERFORMANCE OF URINARY FILTRATION, MULTIPLE: ICD-10-PCS | Performed by: INTERNAL MEDICINE

## 2017-04-19 PROCEDURE — 90935 HEMODIALYSIS ONE EVALUATION: CPT

## 2017-04-19 PROCEDURE — 82962 GLUCOSE BLOOD TEST: CPT

## 2017-04-19 PROCEDURE — 84100 ASSAY OF PHOSPHORUS: CPT | Performed by: INTERNAL MEDICINE

## 2017-04-19 PROCEDURE — 80500 HEPATITIS B PROFILE: CPT | Performed by: INTERNAL MEDICINE

## 2017-04-19 PROCEDURE — 87340 HEPATITIS B SURFACE AG IA: CPT | Performed by: INTERNAL MEDICINE

## 2017-04-19 PROCEDURE — 83735 ASSAY OF MAGNESIUM: CPT | Performed by: INTERNAL MEDICINE

## 2017-04-19 RX ORDER — ASPIRIN 81 MG/1
81 TABLET, CHEWABLE ORAL DAILY
Status: COMPLETED | OUTPATIENT
Start: 2017-04-20 | End: 2017-04-22

## 2017-04-19 RX ORDER — LIDOCAINE HYDROCHLORIDE 20 MG/ML
INJECTION, SOLUTION EPIDURAL; INFILTRATION; INTRACAUDAL; PERINEURAL
Status: COMPLETED
Start: 2017-04-19 | End: 2017-04-19

## 2017-04-19 RX ORDER — SODIUM CHLORIDE 9 MG/ML
INJECTION, SOLUTION INTRAVENOUS
Status: COMPLETED
Start: 2017-04-19 | End: 2017-04-19

## 2017-04-19 RX ORDER — SODIUM CHLORIDE 9 MG/ML
INJECTION, SOLUTION INTRAVENOUS ONCE
Status: COMPLETED | OUTPATIENT
Start: 2017-04-19 | End: 2017-04-19

## 2017-04-19 RX ORDER — SODIUM CHLORIDE 0.9 % (FLUSH) 0.9 %
10 SYRINGE (ML) INJECTION AS NEEDED
Status: DISCONTINUED | OUTPATIENT
Start: 2017-04-19 | End: 2017-04-25

## 2017-04-19 RX ORDER — 0.9 % SODIUM CHLORIDE 0.9 %
VIAL (ML) INJECTION
Status: DISPENSED
Start: 2017-04-19 | End: 2017-04-20

## 2017-04-19 RX ORDER — MIDAZOLAM HYDROCHLORIDE 1 MG/ML
2 INJECTION INTRAMUSCULAR; INTRAVENOUS EVERY 5 MIN PRN
Status: DISCONTINUED | OUTPATIENT
Start: 2017-04-19 | End: 2017-04-24

## 2017-04-19 RX ORDER — MIDAZOLAM HYDROCHLORIDE 1 MG/ML
INJECTION INTRAMUSCULAR; INTRAVENOUS
Status: COMPLETED
Start: 2017-04-19 | End: 2017-04-19

## 2017-04-19 NOTE — DISCHARGE PLANNING
LINDSEY spoke with nephrologist who states the pt will [place] needing HD at [place], with plans already started for [facility]. LINDSEY initiated referral to [facility] central admissions via allscripts and the [facility] via paper fax.  Call placed to initiate the

## 2017-04-19 NOTE — PLAN OF CARE
Patient/Family is able to understand and participate in their care Progressing    Family at bedside and involved in care.   Maintains optimal cardiac output and hemodynamic stability Progressing      Absence of cardiac arrhythmias or at baseline Progressing

## 2017-04-19 NOTE — PROGRESS NOTES
[facility]    Progress Note    Mohsenjamie Chavez Patient Status:  Inpatient    10/17/1945 MRN [id number]   Location [facility] Attending [address], [facility] Day # 6 PCP Clifford Kwan MD, MD Peggy Acosta Results  Component Value Date   WBC 7.5 04/19/2017   HGB 7.5* 04/19/2017   HCT 22.5* 04/19/2017    04/19/2017   CREATSERUM 5.96* 04/19/2017   BUN 63* 04/19/2017    04/19/2017   K 5.0 04/19/2017    04/19/2017   CO2 22 04/19/2017   GLU 91

## 2017-04-19 NOTE — PROGRESS NOTES
[facility]    Progress Note    Mary Macias Patient Status:  Inpatient    10/17/1945 MRN [id number]   Location [facility] 3W/SW Attending [address] Day # 6 PCP Jose Luis Ma MD, MD       Subjective:   Shaun Bobby 2.81 04/15/2017   MG 1.9 04/19/2017   PHOS 5.2* 04/19/2017   TROP 0.72* 04/17/2017                   Susanne Dutton MD  4/19/2017

## 2017-04-19 NOTE — PROGRESS NOTES
Patient seen in follow up. No overnight events. Patient denies any chest pain or sob. Denies any cough. Denies abdominal pain.       04/19/17  1130   BP: 147/80   Pulse: 65   Temp:    Resp: 16       Intake/Output Summary (Last 24 hours) at 04/19/17 insulin aspart (NOVOLOG) 100 UNIT/ML flexpen 1-5 Units 1-5 Units Subcutaneous TID CC   furosemide (LASIX) tab 40 mg 40 mg Oral BID (Diuretic)   AmLODIPine Besylate (NORVASC) tab 10 mg 10 mg Oral Daily   carvedilol (COREG) tab 12.5 mg 12.5 mg Oral Daily Non-ST elevation myocardial infarction:  Patient has elevated troponin with EKG changes, definitely concerning for coronary artery disease.  I spoke with the patient in detail and with his son about my recommendation.  Patient needs to have coronary angiog

## 2017-04-19 NOTE — PROCEDURES
[facility]    PATIENT'S NAME: Macie Lucrecia   ATTENDING PHYSICIAN: Fahad Grace MD   OPERATING PHYSICIAN: Malorie Wallace MD   PATIENT ACCOUNT#:   [id number]    LOCATION:  [address] [place] [address] RECORD #:   [id number]       DATE OF BIRTH:  8 significant angiographic disease. Left Circumflex: The left circumflex is a large-caliber artery and gives large OM1. There is significant stenosis in the proximal left circumflex, showed 80% to 90% stenosis.   OM2 is a small- to moderate-sized artery

## 2017-04-19 NOTE — PROGRESS NOTES
Cath reviewed. Pt will need eventual CABG to LAD and OM possibly RCA. Recommend stabilizing renal status with commencement of dialysis then bring back for elective CABG.

## 2017-04-19 NOTE — BRIEF PROCEDURE NOTE
Preop diagnosis: NSTEMA  Post op diagnosis: same  Procedures: LH, COR  Findings:   80-90% LCx  80% LAD  60% proximal RCA, dominant, severe diffuse disease in PDA up to 90%. LVEDP 22. EBL: 20 mls  Specimens: None  Rec: CTS consult for CABG.  Continue ASA,

## 2017-04-19 NOTE — CONSULTS
[facility]    Report of Consultation    Canlisa Parnell Patient Status:  Inpatient    10/17/1945 MRN [id number]   Location [facility] 3W/SW Attending  [address] Day # 6 PCP Verneita Lennox, MD, MD     Date of Admission MG/2ML injection 2 mg 2 mg Intravenous Q5 Min PRN   Iopamidol (ISOVUE-M) 61 % injection 150 mL 150 mL Injection ONCE PRN   [START ON 4/20/2017] aspirin chewable tab 81 mg 81 mg Oral Daily   Normal Saline Flush 0.9 % injection 10 mL 10 mL Intravenous PRN MG/ML Oral Syrup Take by mouth daily. sodium bicarbonate 650 MG Oral Tab Take 600 mg by mouth 3 (three) times daily. Cetirizine HCl 5 MG Oral Tab Take by mouth daily. levofloxacin 25 MG/ML Oral Solution Take 125 mg by mouth daily.        Allergies Oral Daily       Continuous Infusions:      Patient is awake alert and in no distress. The head is normocephalic. Pupils are equally round and reactive. The sclera are nonicteric. Hearing appears adequate in both ears.   Neck is soft supple with adequat diffusely diseased and probably not reconstructable. The patient has acute renal failure and is to start dialysis today. Would defer surgery until he has had at least 1-2 weeks of stabilization on dialysis prior to considering bypass surgery.   If he were

## 2017-04-19 NOTE — PROGRESS NOTES
Post cardiac cath note:  Report called to [facility]. Transferred to cath lab holding. Hand off given to holding room RN.

## 2017-04-20 ENCOUNTER — APPOINTMENT (OUTPATIENT)
Dept: ULTRASOUND IMAGING | Facility: HOSPITAL | Age: 72
DRG: 233 | End: 2017-04-20
Attending: PHYSICIAN ASSISTANT
Payer: MEDICAID

## 2017-04-20 PROCEDURE — 85730 THROMBOPLASTIN TIME PARTIAL: CPT | Performed by: INTERNAL MEDICINE

## 2017-04-20 PROCEDURE — 85025 COMPLETE CBC W/AUTO DIFF WBC: CPT | Performed by: INTERNAL MEDICINE

## 2017-04-20 PROCEDURE — 81001 URINALYSIS AUTO W/SCOPE: CPT | Performed by: NURSE PRACTITIONER

## 2017-04-20 PROCEDURE — 83735 ASSAY OF MAGNESIUM: CPT | Performed by: INTERNAL MEDICINE

## 2017-04-20 PROCEDURE — 93880 EXTRACRANIAL BILAT STUDY: CPT

## 2017-04-20 PROCEDURE — 87081 CULTURE SCREEN ONLY: CPT | Performed by: NURSE PRACTITIONER

## 2017-04-20 PROCEDURE — 82962 GLUCOSE BLOOD TEST: CPT

## 2017-04-20 PROCEDURE — 80053 COMPREHEN METABOLIC PANEL: CPT | Performed by: INTERNAL MEDICINE

## 2017-04-20 RX ORDER — HEPARIN SODIUM AND DEXTROSE 10000; 5 [USP'U]/100ML; G/100ML
INJECTION INTRAVENOUS CONTINUOUS
Status: DISCONTINUED | OUTPATIENT
Start: 2017-04-20 | End: 2017-04-23

## 2017-04-20 RX ORDER — HEPARIN SODIUM AND DEXTROSE 10000; 5 [USP'U]/100ML; G/100ML
12 INJECTION INTRAVENOUS ONCE
Status: COMPLETED | OUTPATIENT
Start: 2017-04-20 | End: 2017-04-20

## 2017-04-20 RX ORDER — ALBUMIN (HUMAN) 12.5 G/50ML
100 SOLUTION INTRAVENOUS AS NEEDED
Status: DISCONTINUED | OUTPATIENT
Start: 2017-04-21 | End: 2017-04-25

## 2017-04-20 RX ORDER — HEPARIN SODIUM 1000 [USP'U]/ML
60 INJECTION, SOLUTION INTRAVENOUS; SUBCUTANEOUS ONCE
Status: COMPLETED | OUTPATIENT
Start: 2017-04-20 | End: 2017-04-20

## 2017-04-20 NOTE — PROGRESS NOTES
Case discussed with cardiology. Due to anatomy will move up surgical date to Monday and keep in hospital on IV heparin until then. Will get dialysis Friday and Sunday or early Monday AM to optimize prior to surgery.

## 2017-04-20 NOTE — PROGRESS NOTES
[place] [facility] - [facility]    Progress Note    Can Parnell Patient Status:  Inpatient    10/17/1945 MRN [id number]   Location [facility] 3W/SW Attending [address] Day # 7 PCP Verneita Lennox, MD, MD       Subjective:   Meenakshi Paredes Mackenzie Parker MD  4/20/2017

## 2017-04-20 NOTE — PROGRESS NOTES
Misc. Note  Met with patient for pre and post-op open heart surgery education. Patient alone in room asked if he wanted family present he declined family to be present. Open heart binder reviewed.  Instructed NPO after midnight Sunday night for Monday surge

## 2017-04-20 NOTE — PROGRESS NOTES
Patient seen in follow up. Patient denies any chest pain or sob. On initial admission he had R sided chest pain that has since resolved.     04/20/17  0814   BP: 128/57   Pulse: 63   Temp: 97.6 °F (36.4 °C)   Resp: 16       Intake/Output Summary (Last insulin aspart (NOVOLOG) 100 UNIT/ML flexpen 1-5 Units 1-5 Units Subcutaneous TID CC   furosemide (LASIX) tab 40 mg 40 mg Oral BID (Diuretic)   AmLODIPine Besylate (NORVASC) tab 10 mg 10 mg Oral Daily   carvedilol (COREG) tab 12.5 mg 12.5 mg Oral Daily 1. Non ST elevation MI. Cath films reviewed. LCX disease is quite severe, large vessel with some haziness suggestive of thrombus in the area.  RCA disease proximally is borderline about 50-60% however the ostial PDA and throughout is severely diseased not r

## 2017-04-20 NOTE — DIABETES ED
[facility]    Diabetes Education  Note    Elena Chavez Patient Status:  Inpatient   10/17/1945 MRN [id number]  Location [facility] 3W/SW Attending [address] Day # 7 PCP Clifford Kwan MD, MD    Reason for Visit:

## 2017-04-20 NOTE — PLAN OF CARE
CARDIOVASCULAR - ADULT    • Maintains optimal cardiac output and hemodynamic stability Adequate for Discharge          HEMATOLOGIC - ADULT    • Maintains hematologic stability Progressing        METABOLIC/FLUID AND ELECTROLYTES - ADULT    • Electrolytes ma

## 2017-04-20 NOTE — PLAN OF CARE
Problem: Patient/Family Goals  Goal: Patient/Family Long Term Goal  Patient’s Long Term Goal: For kidney function to improve    Interventions:  - Nephro on consult  -Take medications as prescribed  - See additional Care Plan goals for specific intervention

## 2017-04-21 PROCEDURE — 80069 RENAL FUNCTION PANEL: CPT | Performed by: INTERNAL MEDICINE

## 2017-04-21 PROCEDURE — 82962 GLUCOSE BLOOD TEST: CPT

## 2017-04-21 PROCEDURE — 85025 COMPLETE CBC W/AUTO DIFF WBC: CPT | Performed by: INTERNAL MEDICINE

## 2017-04-21 PROCEDURE — 90935 HEMODIALYSIS ONE EVALUATION: CPT

## 2017-04-21 RX ORDER — ALBUMIN (HUMAN) 12.5 G/50ML
100 SOLUTION INTRAVENOUS AS NEEDED
Status: DISCONTINUED | OUTPATIENT
Start: 2017-04-23 | End: 2017-04-25

## 2017-04-21 NOTE — PROGRESS NOTES
[facility]    Progress Note    Dawna Patel Patient Status:  Inpatient    10/17/1945 MRN [id number]   Location [facility] 3W/SW Attending [address] Day # 8 PCP Neal Palafox MD, MD       Subjective:   Marry Ackerman Gian - Andreas Img (cpt=93880)    4/20/2017  CONCLUSION:  Bilateral carotid bifurcation/proximal ICA plaque without hemodynamic significance.         Ekg 12-lead    4/19/2017  ECG Report  Interpretation  -------------------------- Sinus Rhythm - High amplitud

## 2017-04-21 NOTE — PROGRESS NOTES
[facility]    Progress Note    Mara Ortizmarnie Patient Status:  Inpatient    10/17/1945 MRN [id number]   Location [facility] 3W/SW Attending [address] Day # 7 PCP Perlita Love MD, MD     Subjective:   Subjective 04/20/2017   WBC 8.7 04/20/2017   HGB 10.1* 04/20/2017   HGB 10.1* 04/20/2017   HCT 30.3* 04/20/2017   HCT 30.4* 04/20/2017    04/20/2017    04/20/2017   CREATSERUM 4.22* 04/20/2017   BUN 33* 04/20/2017    04/20/2017   K 4.6 04/20/2017

## 2017-04-21 NOTE — PROGRESS NOTES
Patient seen in follow up. No overnight events. Patient denies any chest pain or sob. Denies any cough. Denies abdominal pain. Labs reviewed Creatinine 5.17, Hemoglobin 10.    Vitals reviewed BP stable, stating %     04/21/17  1315   BP: 135/ Alfuzosin HCl ER (UROXATRAL) 24 hr tab 10 mg 10 mg Oral Daily with breakfast   nitroGLYCERIN (NITRODUR) 0.1 MG/HR 1 patch 1 patch Transdermal Daily   dextrose injection 50 mL 50 mL Intravenous PRN   Glucose-Vitamin C (DEX-4) 4-0.006 g chewable tab 4 tablet ALB 3.7 04/21/2017   .9 04/20/2017   PHOS 4.4 04/21/2017       IMPRESSION:  1. Non ST elevation MI. Cath films reviewed. LCX disease is quite severe, large vessel with some haziness suggestive of thrombus in the area.  RCA disease proximally is borde

## 2017-04-21 NOTE — PROGRESS NOTES
[place] [facility]    Brief Note    Sinai Fulton Patient Status:  Inpatient    10/17/1945 MRN [id number]   Location [facility] 3W/ Attending [address] Day # 8 PCP Nicole Cobian MD, MD     Date of Note:  2017  Lyly Ponce

## 2017-04-21 NOTE — PROGRESS NOTES
[facility]    Progress Note    Patria Best Patient Status:  Inpatient    10/17/1945 MRN [id number]   Location [facility] 3W/SW Attending [address] Day # 8 PCP Cora Alonso MD, MD     Subjective:   Subjective Results  Component Value Date   WBC 8.1 04/21/2017   HGB 10.0* 04/21/2017   HCT 30.1* 04/21/2017    04/21/2017   CREATSERUM 5.17* 04/21/2017   BUN 46* 04/21/2017   * 04/21/2017   K 4.7 04/21/2017    04/21/2017   CO2 23 04/21/2017   GLU 9

## 2017-04-21 NOTE — PROGRESS NOTES
Patient has active bleeding from right groin site from cath 2 days ago. Currently has 5lb weight on site after 20 minutes of RN holding direct pressure to site.  Notified Dr Steven Quinonez with [facility] that patient has active bleeding and is now refusi

## 2017-04-21 NOTE — PLAN OF CARE
Maintains optimal cardiac output and hemodynamic stability Not Progressing    Awaiting CABG on Monday  Patient/Family is able to understand and participate in their care Progressing      Absence of cardiac arrhythmias or at baseline Progressing    Brief sv

## 2017-04-22 PROCEDURE — 82962 GLUCOSE BLOOD TEST: CPT

## 2017-04-22 PROCEDURE — 94667 MNPJ CHEST WALL 1ST: CPT

## 2017-04-22 PROCEDURE — 80069 RENAL FUNCTION PANEL: CPT | Performed by: INTERNAL MEDICINE

## 2017-04-22 NOTE — PLAN OF CARE
Patient/Family is able to understand and participate in their care Progressing      Maintains optimal cardiac output and hemodynamic stability Progressing      Absence of cardiac arrhythmias or at baseline Progressing      Glucose maintained within prescri

## 2017-04-22 NOTE — PROGRESS NOTES
[facility]    Progress Note    Best Bro Patient Status:  Inpatient    10/17/1945 MRN [id number]   Location [facility] 3W/SW Attending [address] Day # 9 PCP Esteban Ortega MD, MD     Subjective:   Subjective Results:     Lab Results  Component Value Date   WBC 8.1 04/21/2017   HGB 10.0* 04/21/2017   HCT 30.1* 04/21/2017    04/21/2017   CREATSERUM 4.10* 04/22/2017   BUN 26* 04/22/2017    04/22/2017   K 4.3 04/22/2017    04/22/2017   C

## 2017-04-22 NOTE — PLAN OF CARE
CARDIOVASCULAR - ADULT    • Maintains optimal cardiac output and hemodynamic stability Not Progressing            VSS. Cath site C/D/I, no bleeding at this time. Dialysis completed this morning, next scheduled for 4/23. Plan continues to be CABG on Monday.

## 2017-04-22 NOTE — PROGRESS NOTES
Patient seen in follow up.  Patient denies any chest pain or sob.   04/22/17  1625   BP: 144/60   Pulse: 61   Temp: 98.1 °F (36.7 °C)   Resp: 14       Intake/Output Summary (Last 24 hours) at 04/22/17 1730  Last data filed at 04/22/17 1700   Gross per NEPHRO-LIZA (NEPHRO-LIZA) tab 0.8 mg 1 tablet Oral Daily   Alfuzosin HCl ER (UROXATRAL) 24 hr tab 10 mg 10 mg Oral Daily with breakfast   nitroGLYCERIN (NITRODUR) 0.1 MG/HR 1 patch 1 patch Transdermal Daily   dextrose injection 50 mL 50 mL Intravenous PRN 1. Non ST elevation MI. Cath films reviewed. LCX disease is quite severe, large vessel with some haziness suggestive of thrombus in the area.  RCA disease proximally is borderline about 50-60% however the ostial PDA and throughout is severely diseased not r

## 2017-04-23 PROCEDURE — 86920 COMPATIBILITY TEST SPIN: CPT

## 2017-04-23 PROCEDURE — 86850 RBC ANTIBODY SCREEN: CPT | Performed by: NURSE PRACTITIONER

## 2017-04-23 PROCEDURE — 86900 BLOOD TYPING SEROLOGIC ABO: CPT | Performed by: NURSE PRACTITIONER

## 2017-04-23 PROCEDURE — 86901 BLOOD TYPING SEROLOGIC RH(D): CPT | Performed by: NURSE PRACTITIONER

## 2017-04-23 PROCEDURE — 82962 GLUCOSE BLOOD TEST: CPT

## 2017-04-23 RX ORDER — SODIUM CHLORIDE 9 MG/ML
INJECTION, SOLUTION INTRAVENOUS
Status: COMPLETED
Start: 2017-04-23 | End: 2017-04-23

## 2017-04-23 RX ORDER — 0.9 % SODIUM CHLORIDE 0.9 %
VIAL (ML) INJECTION
Status: DISPENSED
Start: 2017-04-23 | End: 2017-04-23

## 2017-04-23 NOTE — PLAN OF CARE
CARDIOVASCULAR - ADULT    • Maintains optimal cardiac output and hemodynamic stability Not Progressing    • Absence of cardiac arrhythmias or at baseline Not Progressing        RESPIRATORY - ADULT    • Achieves optimal ventilation and oxygenation Not Progr

## 2017-04-23 NOTE — PROGRESS NOTES
[facility]    Progress Note    Yvon July Patient Status:  Inpatient    10/17/1945 MRN [id number]   Location [facility] 3W/SW Attending [address] Day # 10 PCP Kartik Camp MD, MD       Subjective:   Guanako Bernal

## 2017-04-23 NOTE — PROGRESS NOTES
Patient seen in follow up.  Patient denies any chest pain or sob.   04/23/17  1648   BP: 128/80   Pulse: 66   Temp:    Resp: 20       Intake/Output Summary (Last 24 hours) at 04/23/17 1704  Last data filed at 04/23/17 1500   Gross per 24 hour   Intake Alfuzosin HCl ER (UROXATRAL) 24 hr tab 10 mg 10 mg Oral Daily with breakfast   nitroGLYCERIN (NITRODUR) 0.1 MG/HR 1 patch 1 patch Transdermal Daily   dextrose injection 50 mL 50 mL Intravenous PRN   Glucose-Vitamin C (DEX-4) 4-0.006 g chewable tab 4 tablet 3. Anemia, stable. RECOMMENDATION:  Remains stable. Tele stable. D/w RN. Surgery Monday. Tam TRÄSLÖVSLÄGE, DO [facility]  [address], [facility]  Phone: [phone]  [email or web address]

## 2017-04-23 NOTE — PROGRESS NOTES
[place] [facility]    Progress Note    Best rBo Patient Status:  Inpatient    10/17/1945 MRN [id number]   Location [facility] 3W/SW Attending [address] Day # 10 PCP Esteban Ortega MD, MD     Subjective:   Candi Newman WBC 8.1 04/21/2017   HGB 10.0* 04/21/2017   HCT 30.1* 04/21/2017    04/21/2017   CREATSERUM 4.10* 04/22/2017   BUN 26* 04/22/2017    04/22/2017   K 4.3 04/22/2017    04/22/2017   CO2 25 04/22/2017   GLU 91 04/22/2017   CA 8.6 04/22/201

## 2017-04-24 ENCOUNTER — ANESTHESIA (OUTPATIENT)
Dept: SURGERY | Facility: HOSPITAL | Age: 72
DRG: 233 | End: 2017-04-24
Payer: MEDICAID

## 2017-04-24 ENCOUNTER — ANESTHESIA EVENT (OUTPATIENT)
Dept: SURGERY | Facility: HOSPITAL | Age: 72
DRG: 233 | End: 2017-04-24
Payer: MEDICAID

## 2017-04-24 ENCOUNTER — APPOINTMENT (OUTPATIENT)
Dept: GENERAL RADIOLOGY | Facility: HOSPITAL | Age: 72
DRG: 233 | End: 2017-04-24
Attending: THORACIC SURGERY (CARDIOTHORACIC VASCULAR SURGERY)
Payer: MEDICAID

## 2017-04-24 PROCEDURE — 30233K1 TRANSFUSION OF NONAUTOLOGOUS FROZEN PLASMA INTO PERIPHERAL VEIN, PERCUTANEOUS APPROACH: ICD-10-PCS | Performed by: THORACIC SURGERY (CARDIOTHORACIC VASCULAR SURGERY)

## 2017-04-24 PROCEDURE — 85347 COAGULATION TIME ACTIVATED: CPT | Performed by: THORACIC SURGERY (CARDIOTHORACIC VASCULAR SURGERY)

## 2017-04-24 PROCEDURE — 80048 BASIC METABOLIC PNL TOTAL CA: CPT | Performed by: THORACIC SURGERY (CARDIOTHORACIC VASCULAR SURGERY)

## 2017-04-24 PROCEDURE — 83050 HGB METHEMOGLOBIN QUAN: CPT | Performed by: THORACIC SURGERY (CARDIOTHORACIC VASCULAR SURGERY)

## 2017-04-24 PROCEDURE — 85610 PROTHROMBIN TIME: CPT | Performed by: INTERNAL MEDICINE

## 2017-04-24 PROCEDURE — 85384 FIBRINOGEN ACTIVITY: CPT | Performed by: THORACIC SURGERY (CARDIOTHORACIC VASCULAR SURGERY)

## 2017-04-24 PROCEDURE — 06BQ4ZZ EXCISION OF LEFT SAPHENOUS VEIN, PERCUTANEOUS ENDOSCOPIC APPROACH: ICD-10-PCS | Performed by: THORACIC SURGERY (CARDIOTHORACIC VASCULAR SURGERY)

## 2017-04-24 PROCEDURE — 82962 GLUCOSE BLOOD TEST: CPT

## 2017-04-24 PROCEDURE — 94002 VENT MGMT INPAT INIT DAY: CPT

## 2017-04-24 PROCEDURE — P9045 ALBUMIN (HUMAN), 5%, 250 ML: HCPCS

## 2017-04-24 PROCEDURE — 02100Z9 BYPASS CORONARY ARTERY, ONE ARTERY FROM LEFT INTERNAL MAMMARY, OPEN APPROACH: ICD-10-PCS | Performed by: THORACIC SURGERY (CARDIOTHORACIC VASCULAR SURGERY)

## 2017-04-24 PROCEDURE — 71010 XR CHEST AP PORTABLE  (CPT=71010): CPT

## 2017-04-24 PROCEDURE — 85610 PROTHROMBIN TIME: CPT | Performed by: THORACIC SURGERY (CARDIOTHORACIC VASCULAR SURGERY)

## 2017-04-24 PROCEDURE — 30233R1 TRANSFUSION OF NONAUTOLOGOUS PLATELETS INTO PERIPHERAL VEIN, PERCUTANEOUS APPROACH: ICD-10-PCS | Performed by: THORACIC SURGERY (CARDIOTHORACIC VASCULAR SURGERY)

## 2017-04-24 PROCEDURE — 93010 ELECTROCARDIOGRAM REPORT: CPT | Performed by: THORACIC SURGERY (CARDIOTHORACIC VASCULAR SURGERY)

## 2017-04-24 PROCEDURE — 82805 BLOOD GASES W/O2 SATURATION: CPT | Performed by: THORACIC SURGERY (CARDIOTHORACIC VASCULAR SURGERY)

## 2017-04-24 PROCEDURE — 82375 ASSAY CARBOXYHB QUANT: CPT | Performed by: THORACIC SURGERY (CARDIOTHORACIC VASCULAR SURGERY)

## 2017-04-24 PROCEDURE — 86927 PLASMA FRESH FROZEN: CPT

## 2017-04-24 PROCEDURE — 85025 COMPLETE CBC W/AUTO DIFF WBC: CPT | Performed by: THORACIC SURGERY (CARDIOTHORACIC VASCULAR SURGERY)

## 2017-04-24 PROCEDURE — 85576 BLOOD PLATELET AGGREGATION: CPT | Performed by: THORACIC SURGERY (CARDIOTHORACIC VASCULAR SURGERY)

## 2017-04-24 PROCEDURE — 84132 ASSAY OF SERUM POTASSIUM: CPT | Performed by: THORACIC SURGERY (CARDIOTHORACIC VASCULAR SURGERY)

## 2017-04-24 PROCEDURE — 85025 COMPLETE CBC W/AUTO DIFF WBC: CPT | Performed by: INTERNAL MEDICINE

## 2017-04-24 PROCEDURE — P9047 ALBUMIN (HUMAN), 25%, 50ML: HCPCS

## 2017-04-24 PROCEDURE — 85730 THROMBOPLASTIN TIME PARTIAL: CPT | Performed by: INTERNAL MEDICINE

## 2017-04-24 PROCEDURE — 021209W BYPASS CORONARY ARTERY, THREE ARTERIES FROM AORTA WITH AUTOLOGOUS VENOUS TISSUE, OPEN APPROACH: ICD-10-PCS | Performed by: THORACIC SURGERY (CARDIOTHORACIC VASCULAR SURGERY)

## 2017-04-24 PROCEDURE — 93005 ELECTROCARDIOGRAM TRACING: CPT

## 2017-04-24 PROCEDURE — 85730 THROMBOPLASTIN TIME PARTIAL: CPT | Performed by: THORACIC SURGERY (CARDIOTHORACIC VASCULAR SURGERY)

## 2017-04-24 PROCEDURE — 83735 ASSAY OF MAGNESIUM: CPT | Performed by: THORACIC SURGERY (CARDIOTHORACIC VASCULAR SURGERY)

## 2017-04-24 PROCEDURE — 30233N1 TRANSFUSION OF NONAUTOLOGOUS RED BLOOD CELLS INTO PERIPHERAL VEIN, PERCUTANEOUS APPROACH: ICD-10-PCS | Performed by: INTERNAL MEDICINE

## 2017-04-24 PROCEDURE — A4216 STERILE WATER/SALINE, 10 ML: HCPCS

## 2017-04-24 PROCEDURE — 84295 ASSAY OF SERUM SODIUM: CPT | Performed by: THORACIC SURGERY (CARDIOTHORACIC VASCULAR SURGERY)

## 2017-04-24 PROCEDURE — 85018 HEMOGLOBIN: CPT | Performed by: THORACIC SURGERY (CARDIOTHORACIC VASCULAR SURGERY)

## 2017-04-24 PROCEDURE — 5A1223Z PERFORMANCE OF CARDIAC PACING, CONTINUOUS: ICD-10-PCS | Performed by: THORACIC SURGERY (CARDIOTHORACIC VASCULAR SURGERY)

## 2017-04-24 PROCEDURE — 82330 ASSAY OF CALCIUM: CPT | Performed by: THORACIC SURGERY (CARDIOTHORACIC VASCULAR SURGERY)

## 2017-04-24 RX ORDER — HEPARIN SODIUM 1000 [USP'U]/ML
INJECTION, SOLUTION INTRAVENOUS; SUBCUTANEOUS AS NEEDED
Status: DISCONTINUED | OUTPATIENT
Start: 2017-04-24 | End: 2017-04-24 | Stop reason: HOSPADM

## 2017-04-24 RX ORDER — ASPIRIN 81 MG/1
81 TABLET ORAL DAILY
Status: DISCONTINUED | OUTPATIENT
Start: 2017-04-25 | End: 2017-04-28

## 2017-04-24 RX ORDER — ACETAMINOPHEN 325 MG/1
650 TABLET ORAL EVERY 4 HOURS PRN
Status: DISCONTINUED | OUTPATIENT
Start: 2017-04-24 | End: 2017-04-28

## 2017-04-24 RX ORDER — BISACODYL 10 MG
10 SUPPOSITORY, RECTAL RECTAL
Status: DISCONTINUED | OUTPATIENT
Start: 2017-04-24 | End: 2017-04-28

## 2017-04-24 RX ORDER — METOCLOPRAMIDE 10 MG/1
10 TABLET ORAL ONCE
Status: COMPLETED | OUTPATIENT
Start: 2017-04-24 | End: 2017-04-24

## 2017-04-24 RX ORDER — HEPARIN SODIUM 5000 [USP'U]/ML
5000 INJECTION, SOLUTION INTRAVENOUS; SUBCUTANEOUS EVERY 12 HOURS
Status: DISCONTINUED | OUTPATIENT
Start: 2017-04-25 | End: 2017-04-27

## 2017-04-24 RX ORDER — ACETAMINOPHEN 10 MG/ML
1000 INJECTION, SOLUTION INTRAVENOUS EVERY 8 HOURS
Status: DISPENSED | OUTPATIENT
Start: 2017-04-25 | End: 2017-04-25

## 2017-04-24 RX ORDER — MAGNESIUM SULFATE HEPTAHYDRATE 40 MG/ML
2 INJECTION, SOLUTION INTRAVENOUS AS NEEDED
Status: DISCONTINUED | OUTPATIENT
Start: 2017-04-24 | End: 2017-04-28

## 2017-04-24 RX ORDER — ROCURONIUM BROMIDE 10 MG/ML
INJECTION, SOLUTION INTRAVENOUS AS NEEDED
Status: DISCONTINUED | OUTPATIENT
Start: 2017-04-24 | End: 2017-04-24 | Stop reason: SURG

## 2017-04-24 RX ORDER — DOXEPIN HYDROCHLORIDE 50 MG/1
1 CAPSULE ORAL DAILY
Status: DISCONTINUED | OUTPATIENT
Start: 2017-04-25 | End: 2017-04-27

## 2017-04-24 RX ORDER — POTASSIUM CHLORIDE 29.8 MG/ML
40 INJECTION INTRAVENOUS AS NEEDED
Status: DISCONTINUED | OUTPATIENT
Start: 2017-04-24 | End: 2017-04-28

## 2017-04-24 RX ORDER — FAMOTIDINE 20 MG/1
20 TABLET ORAL 2 TIMES DAILY
Status: DISCONTINUED | OUTPATIENT
Start: 2017-04-25 | End: 2017-04-26

## 2017-04-24 RX ORDER — FAMOTIDINE 10 MG/ML
20 INJECTION, SOLUTION INTRAVENOUS 2 TIMES DAILY
Status: DISCONTINUED | OUTPATIENT
Start: 2017-04-25 | End: 2017-04-26

## 2017-04-24 RX ORDER — ASCORBIC ACID 500 MG
500 TABLET ORAL 3 TIMES DAILY
Status: DISCONTINUED | OUTPATIENT
Start: 2017-04-25 | End: 2017-04-28

## 2017-04-24 RX ORDER — CHLORHEXIDINE GLUCONATE 0.12 MG/ML
15 RINSE ORAL
Status: DISCONTINUED | OUTPATIENT
Start: 2017-04-25 | End: 2017-04-25

## 2017-04-24 RX ORDER — MORPHINE SULFATE 10 MG/ML
INJECTION, SOLUTION INTRAMUSCULAR; INTRAVENOUS AS NEEDED
Status: DISCONTINUED | OUTPATIENT
Start: 2017-04-24 | End: 2017-04-24 | Stop reason: SURG

## 2017-04-24 RX ORDER — SODIUM CHLORIDE 0.9 % (FLUSH) 0.9 %
10 SYRINGE (ML) INJECTION AS NEEDED
Status: DISCONTINUED | OUTPATIENT
Start: 2017-04-24 | End: 2017-04-28

## 2017-04-24 RX ORDER — CHLORHEXIDINE GLUCONATE 0.12 MG/ML
15 RINSE ORAL 2 TIMES DAILY
Status: DISCONTINUED | OUTPATIENT
Start: 2017-04-25 | End: 2017-04-28

## 2017-04-24 RX ORDER — MIDAZOLAM HYDROCHLORIDE 1 MG/ML
2 INJECTION INTRAMUSCULAR; INTRAVENOUS EVERY 5 MIN PRN
Status: DISCONTINUED | OUTPATIENT
Start: 2017-04-24 | End: 2017-04-25

## 2017-04-24 RX ORDER — FAMOTIDINE 20 MG/1
20 TABLET ORAL ONCE
Status: COMPLETED | OUTPATIENT
Start: 2017-04-24 | End: 2017-04-24

## 2017-04-24 RX ORDER — TEMAZEPAM 15 MG/1
15 CAPSULE ORAL NIGHTLY PRN
Status: DISCONTINUED | OUTPATIENT
Start: 2017-04-24 | End: 2017-04-28

## 2017-04-24 RX ORDER — NEOSTIGMINE METHYLSULFATE 0.5 MG/ML
3 INJECTION INTRAVENOUS ONCE
Status: COMPLETED | OUTPATIENT
Start: 2017-04-25 | End: 2017-04-25

## 2017-04-24 RX ORDER — POTASSIUM CHLORIDE 14.9 MG/ML
20 INJECTION INTRAVENOUS AS NEEDED
Status: DISCONTINUED | OUTPATIENT
Start: 2017-04-24 | End: 2017-04-28

## 2017-04-24 RX ORDER — MORPHINE SULFATE 2 MG/ML
2 INJECTION, SOLUTION INTRAMUSCULAR; INTRAVENOUS EVERY 2 HOUR PRN
Status: DISCONTINUED | OUTPATIENT
Start: 2017-04-24 | End: 2017-04-28

## 2017-04-24 RX ORDER — MIDAZOLAM HYDROCHLORIDE 1 MG/ML
INJECTION INTRAMUSCULAR; INTRAVENOUS AS NEEDED
Status: DISCONTINUED | OUTPATIENT
Start: 2017-04-24 | End: 2017-04-24

## 2017-04-24 RX ORDER — HYDROCODONE BITARTRATE AND ACETAMINOPHEN 5; 325 MG/1; MG/1
1 TABLET ORAL EVERY 4 HOURS PRN
Status: DISCONTINUED | OUTPATIENT
Start: 2017-04-24 | End: 2017-04-28

## 2017-04-24 RX ORDER — ONDANSETRON 2 MG/ML
4 INJECTION INTRAMUSCULAR; INTRAVENOUS EVERY 6 HOURS PRN
Status: DISCONTINUED | OUTPATIENT
Start: 2017-04-24 | End: 2017-04-28

## 2017-04-24 RX ORDER — CLOPIDOGREL BISULFATE 75 MG/1
75 TABLET ORAL DAILY
Status: DISCONTINUED | OUTPATIENT
Start: 2017-04-25 | End: 2017-04-28

## 2017-04-24 RX ORDER — PROTAMINE SULFATE 10 MG/ML
INJECTION, SOLUTION INTRAVENOUS AS NEEDED
Status: DISCONTINUED | OUTPATIENT
Start: 2017-04-24 | End: 2017-04-24 | Stop reason: SURG

## 2017-04-24 RX ORDER — DEXTROSE, SODIUM CHLORIDE, SODIUM LACTATE, POTASSIUM CHLORIDE, AND CALCIUM CHLORIDE 5; .6; .31; .03; .02 G/100ML; G/100ML; G/100ML; G/100ML; G/100ML
INJECTION, SOLUTION INTRAVENOUS CONTINUOUS
Status: DISCONTINUED | OUTPATIENT
Start: 2017-04-25 | End: 2017-04-25

## 2017-04-24 RX ORDER — SODIUM CHLORIDE 9 MG/ML
INJECTION, SOLUTION INTRAVENOUS CONTINUOUS PRN
Status: DISCONTINUED | OUTPATIENT
Start: 2017-04-24 | End: 2017-04-24 | Stop reason: SURG

## 2017-04-24 RX ORDER — METOCLOPRAMIDE HYDROCHLORIDE 5 MG/ML
10 INJECTION INTRAMUSCULAR; INTRAVENOUS EVERY 6 HOURS
Status: DISCONTINUED | OUTPATIENT
Start: 2017-04-25 | End: 2017-04-28

## 2017-04-24 RX ORDER — GLYCOPYRROLATE 0.2 MG/ML
0.6 INJECTION, SOLUTION INTRAMUSCULAR; INTRAVENOUS ONCE
Status: COMPLETED | OUTPATIENT
Start: 2017-04-25 | End: 2017-04-25

## 2017-04-24 RX ORDER — SODIUM CHLORIDE 9 MG/ML
INJECTION, SOLUTION INTRAVENOUS CONTINUOUS
Status: CANCELLED | OUTPATIENT
Start: 2017-04-24

## 2017-04-24 RX ORDER — PAPAVERINE HYDROCHLORIDE 30 MG/ML
INJECTION INTRAMUSCULAR; INTRAVENOUS AS NEEDED
Status: DISCONTINUED | OUTPATIENT
Start: 2017-04-24 | End: 2017-04-24 | Stop reason: HOSPADM

## 2017-04-24 RX ORDER — CEFAZOLIN SODIUM 1 G/3ML
INJECTION, POWDER, FOR SOLUTION INTRAMUSCULAR; INTRAVENOUS AS NEEDED
Status: DISCONTINUED | OUTPATIENT
Start: 2017-04-24 | End: 2017-04-24 | Stop reason: HOSPADM

## 2017-04-24 RX ORDER — HYDROCODONE BITARTRATE AND ACETAMINOPHEN 5; 325 MG/1; MG/1
2 TABLET ORAL EVERY 4 HOURS PRN
Status: DISCONTINUED | OUTPATIENT
Start: 2017-04-24 | End: 2017-04-28

## 2017-04-24 RX ORDER — LIDOCAINE HYDROCHLORIDE 10 MG/ML
INJECTION, SOLUTION EPIDURAL; INFILTRATION; INTRACAUDAL; PERINEURAL AS NEEDED
Status: DISCONTINUED | OUTPATIENT
Start: 2017-04-24 | End: 2017-04-24 | Stop reason: SURG

## 2017-04-24 RX ORDER — NITROGLYCERIN 20 MG/100ML
INJECTION INTRAVENOUS CONTINUOUS PRN
Status: DISCONTINUED | OUTPATIENT
Start: 2017-04-24 | End: 2017-04-25

## 2017-04-24 RX ORDER — MORPHINE SULFATE 2 MG/ML
2 INJECTION, SOLUTION INTRAMUSCULAR; INTRAVENOUS ONCE
Status: COMPLETED | OUTPATIENT
Start: 2017-04-24 | End: 2017-04-24

## 2017-04-24 RX ORDER — DOBUTAMINE HYDROCHLORIDE 100 MG/100ML
INJECTION INTRAVENOUS CONTINUOUS PRN
Status: DISCONTINUED | OUTPATIENT
Start: 2017-04-24 | End: 2017-04-28

## 2017-04-24 RX ORDER — SODIUM CHLORIDE 9 MG/ML
INJECTION, SOLUTION INTRAVENOUS CONTINUOUS
Status: DISCONTINUED | OUTPATIENT
Start: 2017-04-25 | End: 2017-04-28

## 2017-04-24 RX ORDER — PROTAMINE SULFATE 10 MG/ML
25 INJECTION, SOLUTION INTRAVENOUS ONCE
Status: COMPLETED | OUTPATIENT
Start: 2017-04-24 | End: 2017-04-24

## 2017-04-24 RX ORDER — LORAZEPAM 1 MG/1
1 TABLET ORAL ONCE
Status: COMPLETED | OUTPATIENT
Start: 2017-04-24 | End: 2017-04-24

## 2017-04-24 RX ORDER — MORPHINE SULFATE 4 MG/ML
4 INJECTION, SOLUTION INTRAMUSCULAR; INTRAVENOUS EVERY 2 HOUR PRN
Status: DISCONTINUED | OUTPATIENT
Start: 2017-04-24 | End: 2017-04-28

## 2017-04-24 RX ORDER — MORPHINE SULFATE 4 MG/ML
8 INJECTION, SOLUTION INTRAMUSCULAR; INTRAVENOUS EVERY 2 HOUR PRN
Status: DISCONTINUED | OUTPATIENT
Start: 2017-04-24 | End: 2017-04-28

## 2017-04-24 RX ORDER — ALBUMIN, HUMAN INJ 5% 5 %
250 SOLUTION INTRAVENOUS ONCE AS NEEDED
Status: COMPLETED | OUTPATIENT
Start: 2017-04-24 | End: 2017-04-25

## 2017-04-24 RX ADMIN — SODIUM CHLORIDE: 9 INJECTION, SOLUTION INTRAVENOUS at 22:03:00

## 2017-04-24 RX ADMIN — LIDOCAINE HYDROCHLORIDE 50 MG: 10 INJECTION, SOLUTION EPIDURAL; INFILTRATION; INTRACAUDAL; PERINEURAL at 15:54:00

## 2017-04-24 RX ADMIN — SODIUM CHLORIDE: 9 INJECTION, SOLUTION INTRAVENOUS at 15:44:00

## 2017-04-24 RX ADMIN — Medication 0.5 MG: at 16:04:00

## 2017-04-24 RX ADMIN — Medication 0.5 MG: at 16:00:00

## 2017-04-24 RX ADMIN — LIDOCAINE HYDROCHLORIDE 50 MG: 10 INJECTION, SOLUTION EPIDURAL; INFILTRATION; INTRACAUDAL; PERINEURAL at 15:47:00

## 2017-04-24 RX ADMIN — PROTAMINE SULFATE 50 MG: 10 INJECTION, SOLUTION INTRAVENOUS at 21:05:00

## 2017-04-24 RX ADMIN — ROCURONIUM BROMIDE 50 MG: 10 INJECTION, SOLUTION INTRAVENOUS at 15:54:00

## 2017-04-24 RX ADMIN — MORPHINE SULFATE 5 MG: 10 INJECTION, SOLUTION INTRAMUSCULAR; INTRAVENOUS at 15:54:00

## 2017-04-24 RX ADMIN — PROTAMINE SULFATE 400 MG: 10 INJECTION, SOLUTION INTRAVENOUS at 20:00:00

## 2017-04-24 RX ADMIN — MIDAZOLAM HYDROCHLORIDE 2 MG: 1 INJECTION INTRAMUSCULAR; INTRAVENOUS at 15:45:00

## 2017-04-24 RX ADMIN — SODIUM CHLORIDE: 9 INJECTION, SOLUTION INTRAVENOUS at 19:50:00

## 2017-04-24 RX ADMIN — MORPHINE SULFATE 2 MG: 10 INJECTION, SOLUTION INTRAMUSCULAR; INTRAVENOUS at 20:50:00

## 2017-04-24 RX ADMIN — MORPHINE SULFATE 5 MG: 10 INJECTION, SOLUTION INTRAMUSCULAR; INTRAVENOUS at 16:50:00

## 2017-04-24 NOTE — CM/SW NOTE
CTL update from RN: Patient scheduled for CABG today 4/24. Card Cath revealed 3 vessel disease. He has a history of CHF - EF 55-65%, DM, HTN, & ESRD on dialysis since 4/19. Hepatitis B profile ordered for outpatient HD.  Patient has an AV fistula left for

## 2017-04-24 NOTE — PROGRESS NOTES
[place] [facility] - [facility]    Progress Note    Patria Best Patient Status:  Inpatient    10/17/1945 MRN [id number]   Location [facility] 3W/SW Attending [address] Day # 11 PCP Cora Alonso MD, MD     Subjective:   Kaci Alcantara Date   WBC 8.0 04/24/2017   HGB 9.5* 04/24/2017   HCT 28.6* 04/24/2017    04/24/2017   CREATSERUM 4.10* 04/22/2017   BUN 26* 04/22/2017    04/22/2017   K 4.3 04/22/2017    04/22/2017   CO2 25 04/22/2017   GLU 91 04/22/2017   CA 8.6 04/22

## 2017-04-24 NOTE — PROGRESS NOTES
Patient seen in follow up. Patient denies any chest pain or sob.   Discussed with nurse  Chart reviewed   04/24/17  1300   BP: 138/80   Pulse: 58   Temp:    Resp: 19       Intake/Output Summary (Last 24 hours) at 04/24/17 1355  Last data filed at 04/2 Atorvastatin Calcium (LIPITOR) tab 40 mg 40 mg Oral Nightly   Pantoprazole Sodium (PROTONIX) EC tab 40 mg 40 mg Oral QAM AC   Levothyroxine Sodium (SYNTHROID, LEVOTHROID) tab 25 mcg 25 mcg Oral Before breakfast   NEPHRO-LIZA (NEPHRO-LIZA) tab 0.8 mg 1 tabl Time spent over 35 minutes    IMPRESSION:  1. Non ST elevation MI. Cath films reviewed. LCX disease is quite severe, large vessel with some haziness suggestive of thrombus in the area.  RCA disease proximally is borderline about 50-60% however the ostial PD

## 2017-04-24 NOTE — PLAN OF CARE
Problem: CARDIOVASCULAR - ADULT  Goal: Maintains optimal cardiac output and hemodynamic stability  INTERVENTIONS:  - Monitor vital signs, rhythm, and trends  - Monitor for bleeding, hypotension and signs of decreased cardiac output  - Evaluate effectivenes Verbalizes/displays adequate comfort level or patient’s stated pain goal  INTERVENTIONS:  - Encourage pt to monitor pain and request assistance  - Assess pain using appropriate pain scale  - Administer analgesics based on type and severity of pain and eval patient   Outcome: Progressing  Able to communicate needs verbally well.      Problem: METABOLIC/FLUID AND ELECTROLYTES - ADULT  Goal: Electrolytes maintained within normal limits  INTERVENTIONS:  - Monitor labs and rhythm and assess patient for signs and s

## 2017-04-24 NOTE — ANESTHESIA PREPROCEDURE EVALUATION
Anesthesia PreOp Note    HPI:     Tyree Gee is a 66year old male who presents for preoperative consultation requested by: Maynor Negron MD    Date of Surgery: 4/13/2017 - 4/24/2017    Procedure(s):   HEART CORONARY ARTERY BYPASS GRAFT  ENDOSCO 0.8 mg by mouth daily.  Disp:  Rfl:  4/13/2017 at Unknown time   Esomeprazole Magnesium 40 MG Oral Capsule Delayed Release Take 40 mg by mouth every morning before breakfast. Disp:  Rfl:  4/13/2017 at Unknown time   tamsulosin HCl 0.4 MG Oral Cap Take 0.4 m Saundra Palacio MD    Atorvastatin Calcium (LIPITOR) tab 40 mg 40 mg Oral Nightly Carlee White MD 40 mg at 04/23/17 2051   Pantoprazole Sodium (PROTONIX) EC tab 40 mg 40 mg Oral QAM Tawanda Maldonado MD 40 mg at 04/24/17 0397   Levothyroxine Sodium (SYNTHROID 04/24/2017   HCT 28.6* 04/24/2017   MCV 83.3 04/24/2017   MCH 27.6 04/24/2017   MCHC 33.1 04/24/2017   RDW 15.1* 04/24/2017    04/24/2017   MPV 8.4 04/24/2017       Lab Results  Component Value Date    04/22/2017   K 4.3 04/22/2017    04 62.596 kg (138 lb)     SpO2: 98%  99% 99%        Anesthesia ROS/Med Hx and Physical Exam     Patient summary reviewed and Nursing notes reviewed    Airway   Mallampati: I  TM distance: >3 FB  Neck ROM: full  Dental - normal exam     Pulmonary - negative RO

## 2017-04-24 NOTE — PLAN OF CARE
Transferred from [facility] @8274 for CABG 2nd case tomorrow. Pt was refusing Heparin gtt. Dr. Villareal Saylucinda notified and made aware. Heparin gtt order dc'ed since pt is going to OR tomorrow per MD. Plan of care reviewed with pt and wife.

## 2017-04-24 NOTE — ANESTHESIA PROCEDURE NOTES
Arterial Line  Performed by: Meenakshi Izquierdo by: Gissell Mclaughlin    Procedure Start:  4/24/2017 3:45 PM  Procedure End:  4/24/2017 3:59 PM  Site Identification: surface landmarks    Patient Location:  OR  Indication: continuous blood pr Events: patient tolerated procedure well with no complications    PA Catheter Placed?: Yes    PA Catheter Type:  Oximetric  PA Catheter Size:  8  Laterality:  Right  Site:  Internal jugular  Placement Confirmation: pressure tracing changes and verified b

## 2017-04-25 ENCOUNTER — APPOINTMENT (OUTPATIENT)
Dept: GENERAL RADIOLOGY | Facility: HOSPITAL | Age: 72
DRG: 233 | End: 2017-04-25
Attending: CLINICAL NURSE SPECIALIST
Payer: MEDICAID

## 2017-04-25 PROCEDURE — P9047 ALBUMIN (HUMAN), 25%, 50ML: HCPCS | Performed by: INTERNAL MEDICINE

## 2017-04-25 PROCEDURE — 82962 GLUCOSE BLOOD TEST: CPT

## 2017-04-25 PROCEDURE — 82805 BLOOD GASES W/O2 SATURATION: CPT | Performed by: THORACIC SURGERY (CARDIOTHORACIC VASCULAR SURGERY)

## 2017-04-25 PROCEDURE — 93005 ELECTROCARDIOGRAM TRACING: CPT

## 2017-04-25 PROCEDURE — P9045 ALBUMIN (HUMAN), 5%, 250 ML: HCPCS | Performed by: CLINICAL NURSE SPECIALIST

## 2017-04-25 PROCEDURE — 82330 ASSAY OF CALCIUM: CPT | Performed by: THORACIC SURGERY (CARDIOTHORACIC VASCULAR SURGERY)

## 2017-04-25 PROCEDURE — 93010 ELECTROCARDIOGRAM REPORT: CPT | Performed by: CLINICAL NURSE SPECIALIST

## 2017-04-25 PROCEDURE — 36592 COLLECT BLOOD FROM PICC: CPT

## 2017-04-25 PROCEDURE — 94668 MNPJ CHEST WALL SBSQ: CPT

## 2017-04-25 PROCEDURE — 85018 HEMOGLOBIN: CPT | Performed by: THORACIC SURGERY (CARDIOTHORACIC VASCULAR SURGERY)

## 2017-04-25 PROCEDURE — 94003 VENT MGMT INPAT SUBQ DAY: CPT

## 2017-04-25 PROCEDURE — 85007 BL SMEAR W/DIFF WBC COUNT: CPT | Performed by: CLINICAL NURSE SPECIALIST

## 2017-04-25 PROCEDURE — 84132 ASSAY OF SERUM POTASSIUM: CPT | Performed by: THORACIC SURGERY (CARDIOTHORACIC VASCULAR SURGERY)

## 2017-04-25 PROCEDURE — 85025 COMPLETE CBC W/AUTO DIFF WBC: CPT | Performed by: CLINICAL NURSE SPECIALIST

## 2017-04-25 PROCEDURE — 90935 HEMODIALYSIS ONE EVALUATION: CPT

## 2017-04-25 PROCEDURE — 83735 ASSAY OF MAGNESIUM: CPT | Performed by: CLINICAL NURSE SPECIALIST

## 2017-04-25 PROCEDURE — 83050 HGB METHEMOGLOBIN QUAN: CPT | Performed by: THORACIC SURGERY (CARDIOTHORACIC VASCULAR SURGERY)

## 2017-04-25 PROCEDURE — 84295 ASSAY OF SERUM SODIUM: CPT | Performed by: THORACIC SURGERY (CARDIOTHORACIC VASCULAR SURGERY)

## 2017-04-25 PROCEDURE — 85027 COMPLETE CBC AUTOMATED: CPT | Performed by: CLINICAL NURSE SPECIALIST

## 2017-04-25 PROCEDURE — 82375 ASSAY CARBOXYHB QUANT: CPT | Performed by: THORACIC SURGERY (CARDIOTHORACIC VASCULAR SURGERY)

## 2017-04-25 PROCEDURE — S0028 INJECTION, FAMOTIDINE, 20 MG: HCPCS | Performed by: CLINICAL NURSE SPECIALIST

## 2017-04-25 PROCEDURE — 80048 BASIC METABOLIC PNL TOTAL CA: CPT | Performed by: CLINICAL NURSE SPECIALIST

## 2017-04-25 PROCEDURE — 71010 XR CHEST AP PORTABLE  (CPT=71010): CPT

## 2017-04-25 RX ORDER — ALBUMIN (HUMAN) 12.5 G/50ML
100 SOLUTION INTRAVENOUS AS NEEDED
Status: DISCONTINUED | OUTPATIENT
Start: 2017-04-25 | End: 2017-04-28

## 2017-04-25 RX ORDER — SODIUM CHLORIDE 0.9 % (FLUSH) 0.9 %
10 SYRINGE (ML) INJECTION AS NEEDED
Status: DISCONTINUED | OUTPATIENT
Start: 2017-04-25 | End: 2017-04-28

## 2017-04-25 RX ORDER — SODIUM CHLORIDE 9 MG/ML
INJECTION, SOLUTION INTRAVENOUS
Status: COMPLETED
Start: 2017-04-25 | End: 2017-04-25

## 2017-04-25 NOTE — RESPIRATORY THERAPY NOTE
Placed on spontaneous trial breathing with cpap 5, ps of 5, fio2 40%. Spontaneous parameters done, nif 25, rsbi 65, pt is awake and alert, follow instruction.  At 1000 am, extubated and placed on nc 4lpm .

## 2017-04-25 NOTE — DISCHARGE PLANNING
SW following up on d/c planning for the patient. MDO received for [facility], SW waiting on recommendations from pt. Tentative referral however placed to [facility].   Prior referral made to [facility] and SW will send additional information including martell

## 2017-04-25 NOTE — OPERATIVE REPORT
[facility] OPERATING ROOM  Operative Note     Erica Colfax Location: OR   CSN [id number] MRN [id number]   Admission Date 4/13/2017 Operation Date 4/24/2017   Attending Physician Aleksey Groves Operating Physician Sarah Hayes MD      Preop mg/kg heparin ACT was monitored and kept in the appropriate range. The patient was cannulated started on cardiopulmonary bypass and cooled to 33 degrees. The aorta was crossclamped, antegrade and retrograde cardioplegia were administered.   After the init sternum was then closed using wires. After obtaining meticulous hemostasis the sternum was approximated and soft tissues were irrigated with antibiotic solution and closed in layers with absorbable sutures.   Patient was monitored in the operating room to

## 2017-04-25 NOTE — PHYSICAL THERAPY NOTE
PT eval orders received, attempted to see patient for PT eval early PM. Pt was extubated this AM. Pt just started with dialysis this PM. Will f/u tomorrow for PT eval. RN is aware.

## 2017-04-25 NOTE — PROGRESS NOTES
[place] [facility] - [facility]    Progress Note    Zachariah Peralta Patient Status:  Inpatient    10/17/1945 MRN [id number]   Location [facility] 2W/SW Attending Nirmala Peña Day # 15 PCP Mohini Acosta MD, MD       Subjective:   Elba Branham * Endotracheal tube is in good position. * [place]-Melissa catheter is in the right pulmonary artery. * Chest and mediastinal tubes are in position. * No pneumothorax noted. * Consolidation noted at the left base.           Braxton Nesbitt MD  4/25/2017      HD

## 2017-04-25 NOTE — PROGRESS NOTES
[place] [facility] - [facility]    Progress Note    Oneyda Livers Patient Status:  Inpatient    10/17/1945 MRN [id number]   Location [facility] 2W/SW Attending [address] Day # 15 PCP Chaparro Aguilar MD, MD     Subjective:  Yasmeen Preston POD # 1  Intubated; currently on CPAP- wean as keke; extubate as appropriate  Pain meds as needed  Increase activity when appropriate  SCDs and Heparin SubQ prophylaxis DVT prevention   IV Insulin protocol- requirements are low; will plan to transition late

## 2017-04-25 NOTE — PLAN OF CARE
Problem: Diabetes/Glucose Control  Goal: Glucose maintained within prescribed range  INTERVENTIONS:  - Monitor Blood Glucose as ordered  - Assess for signs and symptoms of hyperglycemia and hypoglycemia  - Administer ordered medications to maintain glucose Provide Smoking Cessation handout, if applicable  - Encourage broncho-pulmonary hygiene including cough, deep breathe, Incentive Spirometry  - Assess the need for suctioning and perform as needed  - Assess and instruct to report SOB or any respiratory diff ordered  - Monitor response to electrolyte replacements, including rhythm and repeat lab results as appropriate  - Fluid restriction as ordered  - Instruct patient on fluid and nutrition restrictions as appropriate   Outcome: Progressing  Goal: Hemodynamic

## 2017-04-25 NOTE — PLAN OF CARE
Problem: Diabetes/Glucose Control  Goal: Glucose maintained within prescribed range  INTERVENTIONS:  - Monitor Blood Glucose as ordered  - Assess for signs and symptoms of hyperglycemia and hypoglycemia  - Administer ordered medications to maintain glucose into the planning and delivery of care  - Encourage patient/family to participate in care and decision-making at the level they choose  - Honor patient and family perspectives and choices   Outcome: Progressing  Patient very quiet. rarely speaks.  Varsha Thompson facilitate oxygenation and minimize respiratory effort  - Oxygen supplementation based on oxygen saturation or ABGs  - Provide Smoking Cessation handout, if applicable  - Encourage broncho-pulmonary hygiene including cough, deep breathe, Incentive Spiromet assistive devices as appropriate  - Consider OT/PT consult to assist with strengthening/mobility  - Encourage toileting schedule   Outcome: Progressing  Educated on use of call light. Educated on potential reasons for fall risk post op.     Problem: Altered

## 2017-04-25 NOTE — PROGRESS NOTES
Patient seen in follow up. POD #1 CABG   Receiving dialysis   On 1 Filipe dobutamine   Temporary pacemaker in place, Chest tube in place   CXR: Increasing opacity left mid chest more likely fluid in the posterior left major fissure.  Followup study adv DOBUTamine in D5W (DOBUTREX) 250 mg/250 ml infusion 2.5-10 mcg/kg/min Intravenous Continuous PRN   nitroGLYCERIN infusion 50mg in D5W 250ml 5-300 mcg/min Intravenous Continuous PRN   norepinephrine (LEVOPHED) 4 mg/250 ml premix infusion 0.5-30 mcg/min Intr Clopidogrel Bisulfate (PLAVIX) tab 75 mg 75 mg Oral Daily   aspirin EC tab 81 mg 81 mg Oral Daily   Chlorhexidine Gluconate (PERIDEX) 0.12 % solution 15 mL 15 mL Mouth/Throat BID   Heparin Sodium (Porcine) 5000 UNIT/ML injection 5,000 Units 5,000 Units Sub Esomeprazole Magnesium 40 MG Oral Capsule Delayed Release Take 40 mg by mouth every morning before breakfast.   tamsulosin HCl 0.4 MG Oral Cap Take 0.4 mg by mouth daily.    glimepiride 1 MG Oral Tab Take 1 mg by mouth daily with breakfast.   AmLODIPine Bes IMPRESSION:    1. POD #1 CABG LIMA to LAD, SVG sequential to OM and distal circumflex artery, SVG to rPDA    2. Non ST elevation MI. Cath films reviewed. LCX disease is quite severe, large vessel with some haziness suggestive of thrombus in the area.  RCA d

## 2017-04-25 NOTE — ANESTHESIA POSTPROCEDURE EVALUATION
Patient: Dawna Patel    Procedure Summary     Date Anesthesia Start Anesthesia Stop Room / Location    04/24/17 [id number] [facility] MAIN OR 17 / EM MAIN OR       Procedure Diagnosis Surgeon Responsible Provider    HEART CORONARY ARTERY BYPASS GRAFT (N/A )

## 2017-04-25 NOTE — CM/SW NOTE
CTL update from CV APN. Met w/ patient at bedside and family at bedside. Patient currently on dialysis. He is CABG X4, postop day 1. Extubated this morning.   P.T.  will re-attempt to do initial evaluation 4/26 pending recommendations for Rehab vs. HH.

## 2017-04-25 NOTE — DIETARY NOTE
NUTRITION:  Diet Education    Consult received for diet education per protocol. Education deferred until s/p intervention and when appropriate for teaching. Patient/family visited.  Nutrition care discussed/handout provided on what to except after cardi

## 2017-04-26 PROCEDURE — 97166 OT EVAL MOD COMPLEX 45 MIN: CPT

## 2017-04-26 PROCEDURE — 36592 COLLECT BLOOD FROM PICC: CPT

## 2017-04-26 PROCEDURE — 94668 MNPJ CHEST WALL SBSQ: CPT

## 2017-04-26 PROCEDURE — 97116 GAIT TRAINING THERAPY: CPT

## 2017-04-26 PROCEDURE — 80069 RENAL FUNCTION PANEL: CPT | Performed by: INTERNAL MEDICINE

## 2017-04-26 PROCEDURE — 82962 GLUCOSE BLOOD TEST: CPT

## 2017-04-26 PROCEDURE — 97162 PT EVAL MOD COMPLEX 30 MIN: CPT

## 2017-04-26 RX ORDER — FAMOTIDINE 20 MG/1
20 TABLET ORAL DAILY
Status: DISCONTINUED | OUTPATIENT
Start: 2017-04-27 | End: 2017-04-28

## 2017-04-26 RX ORDER — CHLORPROMAZINE HYDROCHLORIDE 25 MG/1
25 TABLET, FILM COATED ORAL 3 TIMES DAILY PRN
Status: DISCONTINUED | OUTPATIENT
Start: 2017-04-26 | End: 2017-04-27

## 2017-04-26 RX ORDER — ALBUMIN (HUMAN) 12.5 G/50ML
100 SOLUTION INTRAVENOUS AS NEEDED
Status: DISCONTINUED | OUTPATIENT
Start: 2017-04-28 | End: 2017-04-28

## 2017-04-26 NOTE — PROGRESS NOTES
[place] [facility] - [facility]    Progress Note    Brandne Rousseau Patient Status:  Inpatient    10/17/1945 MRN [id number]   Location [facility] 2W/SW Attending Gui Eller Day # 15 PCP Lainey Ponce MD, MD       Subjective:   Chao Mcneill tablet Oral Daily   • Alfuzosin HCl ER  10 mg Oral Daily with breakfast   • nitroGLYCERIN  1 patch Transdermal Daily   • [MAR Hold] insulin aspart  1-5 Units Subcutaneous TID CC   • AmLODIPine Besylate  10 mg Oral Daily       Continuous Infusions:   • sodi Portable  (cpt=71010)    4/25/2017  CONCLUSION:   * Endotracheal tube is in good position. * [place]-Melissa catheter is in the right pulmonary artery. * Chest and mediastinal tubes are in position. * No pneumothorax noted.  * Consolidation noted at the left base

## 2017-04-26 NOTE — PROGRESS NOTES
[facility] Note:  Renal Dose Adjustment    Cosme Worleyjai Lamar has been prescribed famotidine (PEPCID) 20 mg orally every 12 hours. Estimated Creatinine Clearance: 16.1 mL/min (based on Cr of 3.77).     His calculated creatinine clearance is <50 ml/min, t

## 2017-04-26 NOTE — OCCUPATIONAL THERAPY NOTE
OCCUPATIONAL THERAPY EVALUATION - INPATIENT     Room Number: 037/616-P  Evaluation Date: 4/26/2017  Type of Evaluation: Initial       Physician Order: IP Consult to Occupational Therapy  Reason for Therapy: ADL/IADL Dysfunction and Discharge Planning    OC eval/education (goals  5-5)         OCCUPATIONAL THERAPY MEDICAL/SOCIAL HISTORY     Problem List  Principal Problem:    Acute chest pain  Active Problems:    Hyperglycemia    Anemia    Acute on chronic congestive heart failure, unspecified congestive regular lower body clothing?: A Lot  -   Bathing (including washing, rinsing, drying)?: A Lot  -   Toileting, which includes using toilet, bedpan or urinal? : A Lot  -   Putting on and taking off regular upper body clothing?: A Little  -   Taking care of p

## 2017-04-26 NOTE — DISCHARGE PLANNING
SW met with the patient and his daughter at bedside today re recommendations for sub-acute rehab. Family would like a facility with in-house dialysis. LINDSEY explained that options will be limited d/t the dialysis needs and insurance coverage.   The patient's

## 2017-04-26 NOTE — PHYSICAL THERAPY NOTE
PHYSICAL THERAPY EVALUATION - INPATIENT     Room Number: 036/517-H  Evaluation Date: 4/26/2017  Type of Evaluation: Initial  Physician Order: PT Eval and Treat    Presenting Problem: s/p CABG  Reason for Therapy: Mobility Dysfunction and Discharge Plan History related to current admission: DM, HTN, Kidney disease, AV fistula     Problem List  Principal Problem:    Acute chest pain  Active Problems:    Hyperglycemia    Anemia    Acute on chronic congestive heart failure, unspecified congestive heart f 99%  O2 Device: Nasal cannula  Liters of O2:  2  No shortness of breath    AM-PAC '6-Clicks' INPATIENT SHORT FORM - BASIC MOBILITY  How much difficulty does the patient currently have. ..  -   Turning over in bed (including adjusting bedclothes, sheets and Current Status    Goal #4 Patient to demonstrate independence with home activity/exercise instructions provided to patient in preparation for discharge.    Goal #4   Current Status    Goal #5    Goal #5   Current Status    Goal #6    Goal #6  Current Stat

## 2017-04-26 NOTE — PLAN OF CARE
Problem: Diabetes/Glucose Control  Goal: Glucose maintained within prescribed range  INTERVENTIONS:  - Monitor Blood Glucose as ordered  - Assess for signs and symptoms of hyperglycemia and hypoglycemia  - Administer ordered medications to maintain glucose stability  - Monitor arterial and/or venous puncture sites for bleeding and/or hematoma  - Assess quality of pulses, skin color and temperature  - Assess for signs of decreased coronary artery perfusion - ex.  Angina  - Evaluate fluid balance, assess for ed relaxation techniques  - Monitor for opioid side effects  - Notify MD/LIP if interventions unsuccessful or patient reports new pain   Outcome: Progressing    Problem: SAFETY ADULT - FALL  Goal: Free from fall injury  INTERVENTIONS:  - Assess pt frequently maintained  INTERVENTIONS:  - Monitor labs and assess for signs and symptoms of volume excess or deficit  - Monitor intake, output and patient weight  - Monitor urine specific gravity, serum osmolarity and serum sodium as indicated or ordered  - Monitor re

## 2017-04-26 NOTE — PROGRESS NOTES
[facility]    Progress Note    Joe Ortez Patient Status:  Inpatient    10/17/1945 MRN [id number]   Location [facility] 2W/SW Attending Clifford Grad Day # 15 PCP Brad Thao MD, MD       Subjective:   Wanda Tinajero CONCLUSION:  1. Increasing opacity left mid chest more likely fluid in the posterior left major fissure. Followup study advised. Status post coronary bypass changes. No pneumothorax.          Xr Chest Ap Portable  (cpt=71010)    4/25/2017  CONCLUSION:   * E

## 2017-04-26 NOTE — PROGRESS NOTES
Patient seen in follow up. No overnight events. No chest pain or shortness of breath. Patient feeling better. Chest tube out   Vital signs reviewed BP low normal, sating %   Labs reviewed.  Creatinine improved 3.77 from 4.17, K 4.4, WBC 9.7, DOBUTamine in D5W (DOBUTREX) 250 mg/250 ml infusion 2.5-10 mcg/kg/min Intravenous Continuous PRN   norepinephrine (LEVOPHED) 4 mg/250 ml premix infusion 0.5-30 mcg/min Intravenous Continuous PRN   Milrinone in Dextrose 20 MG/100ML premix infusion 0.375 mcg INSULIN STANDARD BOLUS FROM BAG 6.3 Units infusion 0.1 Units/kg Intravenous Once   Atorvastatin Calcium (LIPITOR) tab 40 mg 40 mg Oral Nightly   Levothyroxine Sodium (SYNTHROID, LEVOTHROID) tab 25 mcg 25 mcg Oral Before breakfast   NEPHRO-LIZA (Larrie Polo Xr Chest Ap Portable  (cpt=71010)    4/25/2017  CONCLUSION:   * Endotracheal tube is in good position. * Gervais-Melissa catheter is in the right pulmonary artery. * Chest and mediastinal tubes are in position. * No pneumothorax noted.  * Consolidation noted at t

## 2017-04-27 PROCEDURE — 86850 RBC ANTIBODY SCREEN: CPT | Performed by: PHYSICIAN ASSISTANT

## 2017-04-27 PROCEDURE — 86022 PLATELET ANTIBODIES: CPT | Performed by: NURSE PRACTITIONER

## 2017-04-27 PROCEDURE — 85390 FIBRINOLYSINS SCREEN I&R: CPT | Performed by: NURSE PRACTITIONER

## 2017-04-27 PROCEDURE — 85027 COMPLETE CBC AUTOMATED: CPT | Performed by: THORACIC SURGERY (CARDIOTHORACIC VASCULAR SURGERY)

## 2017-04-27 PROCEDURE — 85027 COMPLETE CBC AUTOMATED: CPT | Performed by: INTERNAL MEDICINE

## 2017-04-27 PROCEDURE — 85007 BL SMEAR W/DIFF WBC COUNT: CPT | Performed by: INTERNAL MEDICINE

## 2017-04-27 PROCEDURE — 85025 COMPLETE CBC W/AUTO DIFF WBC: CPT | Performed by: INTERNAL MEDICINE

## 2017-04-27 PROCEDURE — 80053 COMPREHEN METABOLIC PANEL: CPT | Performed by: INTERNAL MEDICINE

## 2017-04-27 PROCEDURE — 94668 MNPJ CHEST WALL SBSQ: CPT

## 2017-04-27 PROCEDURE — 86901 BLOOD TYPING SEROLOGIC RH(D): CPT | Performed by: PHYSICIAN ASSISTANT

## 2017-04-27 PROCEDURE — 82962 GLUCOSE BLOOD TEST: CPT

## 2017-04-27 PROCEDURE — 90935 HEMODIALYSIS ONE EVALUATION: CPT

## 2017-04-27 PROCEDURE — 86900 BLOOD TYPING SEROLOGIC ABO: CPT | Performed by: PHYSICIAN ASSISTANT

## 2017-04-27 PROCEDURE — 86920 COMPATIBILITY TEST SPIN: CPT

## 2017-04-27 PROCEDURE — 84100 ASSAY OF PHOSPHORUS: CPT | Performed by: INTERNAL MEDICINE

## 2017-04-27 RX ORDER — SODIUM CHLORIDE 9 MG/ML
INJECTION, SOLUTION INTRAVENOUS ONCE
Status: DISCONTINUED | OUTPATIENT
Start: 2017-04-27 | End: 2017-04-28

## 2017-04-27 RX ORDER — SODIUM CHLORIDE 0.9 % (FLUSH) 0.9 %
10 SYRINGE (ML) INJECTION AS NEEDED
Status: DISCONTINUED | OUTPATIENT
Start: 2017-04-27 | End: 2017-04-28

## 2017-04-27 RX ORDER — CHLORPROMAZINE HYDROCHLORIDE 25 MG/1
25 TABLET, FILM COATED ORAL 3 TIMES DAILY PRN
Status: DISCONTINUED | OUTPATIENT
Start: 2017-04-27 | End: 2017-04-28

## 2017-04-27 RX ORDER — 0.9 % SODIUM CHLORIDE 0.9 %
VIAL (ML) INJECTION
Status: DISPENSED
Start: 2017-04-27 | End: 2017-04-27

## 2017-04-27 RX ORDER — 0.9 % SODIUM CHLORIDE 0.9 %
VIAL (ML) INJECTION
Status: COMPLETED
Start: 2017-04-27 | End: 2017-04-27

## 2017-04-27 NOTE — PAYOR COMM NOTE
CONTINUED STAY REVIEW  4/26  Hosp Day #  13  PCP  Verneita Lennox, MD, MD        Subjective:      Constitutional: Negative for fever and fatigue. Respiratory: Negative for cough.    Cardiovascular: Negative for palpitations.    Gastrointestinal: Negative f ALT  15*  04/20/2017    PTT  39.0*  04/24/2017    INR  1.7*  04/24/2017    TSH  2.81  04/15/2017    MG  2.4  04/25/2017    PHOS  4.4  04/26/2017    TROP  0.72*  04/17/2017        Xr Chest Ap Portable  (cpt=71010)    4/25/2017  CONCLUSION:         1.  Incr Chloride           •  famoTIDine   20 mg  Oral  Daily    •  metoprolol tartrate   12.5 mg  Oral  2x Daily(Beta Blocker)    •  insulin aspart   1-5 Units  Subcutaneous  TID CC    •  insulin aspart   2 Units  Subcutaneous  TID CC    •  insulin detemir   10 U prophylaxis; scds;  Hold heparin due to PLTs 96 today; Hipa sent  Pain Medication as needed  Increase activity up and ambulate with PT/OT  Expected post-op fluid volume overload; HX renal failure dialysis tomorrow; has AV fistula left upper arm; nephrology

## 2017-04-27 NOTE — PHYSICAL THERAPY NOTE
Attempted to see pt x2 once in AM the pt was receiving blood transfusion and in PM the pt was received in bed and states that he was fatigue and wants to rest. The pt kindly asked therapist to come back tomorrow. Will attempt tomorrow as appropriate.

## 2017-04-27 NOTE — PROGRESS NOTES
Patient seen in follow up.  BP low normal. Sating 93-97%  Labs reviewed K 4.1, Creatinine 5.64, hemoglobin 6.9   Patient receiving transfusion   Feeling a bit of shortness of breath, incision sternal pain   04/27/17  1020   BP: 100/49   Pulse: 71   Te norepinephrine (LEVOPHED) 4 mg/250 ml premix infusion 0.5-30 mcg/min Intravenous Continuous PRN   Milrinone in Dextrose 20 MG/100ML premix infusion 0.375 mcg/kg/min Intravenous PRN   magnesium hydroxide (MILK OF MAGNESIA) 400 MG/5ML suspension 30 mL 30 mL NEPHRO-LIZA (NEPHRO-LIZA) tab 0.8 mg 1 tablet Oral Daily   Alfuzosin HCl ER (UROXATRAL) 24 hr tab 10 mg 10 mg Oral Daily with breakfast   nitroGLYCERIN (NITRODUR) 0.1 MG/HR 1 patch 1 patch Transdermal Daily   dextrose injection 50 mL 50 mL Intravenous PRN 1. POD #3 CABG LIMA to LAD, SVG sequential to OM and distal circumflex artery, SVG to rPDA    2. Non ST elevation MI. Cath films reviewed. LCX disease is quite severe, large vessel with some haziness suggestive of thrombus in the area.  RCA disease proximal

## 2017-04-27 NOTE — CM/SW NOTE
CTL met w/ CV APN regarding progression of care. Patient is s/p CABG X4, ESRD on dialysis. Patient will have dialysis tomorrow and will continue 3 x week per Renal.  Anemia - HGB 6.9, platelets 96, WBC 01.5; will receive RBC's today.   Per CV, he may be r

## 2017-04-27 NOTE — PROGRESS NOTES
Misc. Note    Brook Rothman, MARY  2017  [facility]    Progress Note    Bairon Johns Patient Status:  Inpatient    10/17/1945 MRN [id number]   Location [facility] 2W/SW Attending [address] Yahaira Khan, Shirley Cain # 14 PCP [facility] CICI Alert and oriented X 3; follows commands; moves extremities   Psychiatric: calm  Sternum Incision dressing C/D/I; Chest tube dressing from chest tube removeal drainage circled for draining yesterday no fresh active drainage;  Left leg incision dressing C/D/

## 2017-04-27 NOTE — DIABETES ED
[facility]    Diabetes Education  Note    Tonny Burcho Patient Status:  Inpatient   10/17/1945 MRN [id number]  Location [facility] 2W/ Attending [address] Day # 15 PCP Radha Hernandez MD, MD    Reason for Visit:

## 2017-04-27 NOTE — PLAN OF CARE
Problem: Diabetes/Glucose Control  Goal: Glucose maintained within prescribed range  INTERVENTIONS:  - Monitor Blood Glucose as ordered  - Assess for signs and symptoms of hyperglycemia and hypoglycemia  - Administer ordered medications to maintain glucose bleeding, hypotension and signs of decreased cardiac output  - Evaluate effectiveness of vasoactive medications to optimize hemodynamic stability  - Monitor arterial and/or venous puncture sites for bleeding and/or hematoma  - Assess quality of pulses, ski based on type and severity of pain and evaluate response  - Implement non-pharmacological measures as appropriate and evaluate response  - Consider cultural and social influences on pain and pain management  - Manage/alleviate anxiety  - Utilize distractio electrolyte replacements, including rhythm and repeat lab results as appropriate  - Fluid restriction as ordered  - Instruct patient on fluid and nutrition restrictions as appropriate   Outcome: Progressing  Next HD scheduled for Friday.   Goal: Hemodynamic

## 2017-04-27 NOTE — DISCHARGE PLANNING
SW following up on d/c planning for the patient. SW spoke with [facility] and they are reviewing the referral and reaching out to their dialysis center.   Flow sheets were sent to them as well.    3pm: [id number] will accept the patient and will

## 2017-04-28 VITALS
HEIGHT: 67 IN | WEIGHT: 145.81 LBS | TEMPERATURE: 100 F | HEART RATE: 86 BPM | OXYGEN SATURATION: 94 % | DIASTOLIC BLOOD PRESSURE: 60 MMHG | BODY MASS INDEX: 22.89 KG/M2 | RESPIRATION RATE: 15 BRPM | SYSTOLIC BLOOD PRESSURE: 118 MMHG

## 2017-04-28 PROCEDURE — 97530 THERAPEUTIC ACTIVITIES: CPT

## 2017-04-28 PROCEDURE — P9047 ALBUMIN (HUMAN), 25%, 50ML: HCPCS | Performed by: INTERNAL MEDICINE

## 2017-04-28 PROCEDURE — 90935 HEMODIALYSIS ONE EVALUATION: CPT

## 2017-04-28 PROCEDURE — 85025 COMPLETE CBC W/AUTO DIFF WBC: CPT | Performed by: INTERNAL MEDICINE

## 2017-04-28 PROCEDURE — 82962 GLUCOSE BLOOD TEST: CPT

## 2017-04-28 PROCEDURE — 85610 PROTHROMBIN TIME: CPT | Performed by: INTERNAL MEDICINE

## 2017-04-28 PROCEDURE — 97116 GAIT TRAINING THERAPY: CPT

## 2017-04-28 PROCEDURE — 80053 COMPREHEN METABOLIC PANEL: CPT | Performed by: INTERNAL MEDICINE

## 2017-04-28 RX ORDER — SODIUM CHLORIDE 9 MG/ML
INJECTION, SOLUTION INTRAVENOUS
Status: DISCONTINUED
Start: 2017-04-28 | End: 2017-04-28

## 2017-04-28 RX ORDER — ASPIRIN 81 MG/1
81 TABLET ORAL DAILY
Qty: 90 TABLET | Refills: 0 | Status: SHIPPED | OUTPATIENT
Start: 2017-04-28

## 2017-04-28 RX ORDER — ASCORBIC ACID 500 MG
500 TABLET ORAL 3 TIMES DAILY
Qty: 90 TABLET | Refills: 0 | Status: SHIPPED | OUTPATIENT
Start: 2017-04-28

## 2017-04-28 RX ORDER — CLOPIDOGREL BISULFATE 75 MG/1
75 TABLET ORAL DAILY
Qty: 90 TABLET | Refills: 0 | Status: SHIPPED | OUTPATIENT
Start: 2017-04-28

## 2017-04-28 RX ORDER — ATORVASTATIN CALCIUM 40 MG/1
40 TABLET, FILM COATED ORAL NIGHTLY
Qty: 30 TABLET | Refills: 0 | Status: SHIPPED | OUTPATIENT
Start: 2017-04-28

## 2017-04-28 RX ORDER — HEPARIN SODIUM 5000 [USP'U]/ML
5000 INJECTION, SOLUTION INTRAVENOUS; SUBCUTANEOUS EVERY 12 HOURS SCHEDULED
Status: DISCONTINUED | OUTPATIENT
Start: 2017-04-28 | End: 2017-04-28

## 2017-04-28 RX ORDER — AMLODIPINE BESYLATE 10 MG/1
10 TABLET ORAL DAILY
Qty: 30 TABLET | Refills: 0 | Status: SHIPPED | OUTPATIENT
Start: 2017-04-28

## 2017-04-28 RX ORDER — SODIUM CHLORIDE 0.9 % (FLUSH) 0.9 %
10 SYRINGE (ML) INJECTION AS NEEDED
Status: DISCONTINUED | OUTPATIENT
Start: 2017-04-28 | End: 2017-04-28

## 2017-04-28 RX ORDER — HYDROCODONE BITARTRATE AND ACETAMINOPHEN 5; 325 MG/1; MG/1
2 TABLET ORAL EVERY 4 HOURS PRN
Qty: 30 TABLET | Refills: 0 | Status: ON HOLD | OUTPATIENT
Start: 2017-04-28 | End: 2017-05-19

## 2017-04-28 RX ORDER — SODIUM CHLORIDE 9 MG/ML
INJECTION, SOLUTION INTRAVENOUS ONCE
Status: DISCONTINUED | OUTPATIENT
Start: 2017-04-28 | End: 2017-04-28

## 2017-04-28 NOTE — OCCUPATIONAL THERAPY NOTE
OCCUPATIONAL THERAPY TREATMENT NOTE - INPATIENT     Room Number: 146/046-E    Problem List  Principal Problem:    Acute chest pain  Active Problems:    Hyperglycemia    Anemia    Acute on chronic congestive heart failure, unspecified congestive heart failu another person does the patient currently need…  -   Putting on and taking off regular lower body clothing?: A Lot  -   Bathing (including washing, rinsing, drying)?: A Lot  -   Toileting, which includes using toilet, bedpan or urinal? : A Lot  -   Putting

## 2017-04-28 NOTE — CARDIAC REHAB
Cardiac Rehab Phase I    Activity:   Chair: Yes   Ambulation: Yes   Assistive Device: Walker   Distance: 20 feet   Assistance needed: Moderate   Patient tolerated activity: Well. Shower Date: 4/28/17 Tolerated Shower Activity 4/28/17.     Education:  Hand

## 2017-04-28 NOTE — PROGRESS NOTES
[facility]    [facility] Note    Lali Verduzco Patient Status:  Inpatient    10/17/1945 MRN [id number]   Location [facility] 2W/SW Attending Adam Ribera Day # 13 PCP Crow Monsalve MD, MD       Subjective:   Arun Henry IV albumin as needed,  to maintain hemodynamic stability

## 2017-04-28 NOTE — PROGRESS NOTES
Patient seen in follow up. No overnight events. Feeling better. S/p dialysis.  S/p transfusion   Vital signs reviewed BP better now in 061S systolic sating 44-40%  Labs K 4.1, Creatinine 6.92, INR 1.5, WBC 10.6, Hgb 8.1        04/28/17  1200   BP: 1 Normal Saline Flush 0.9 % injection 10 mL 10 mL Intravenous PRN   0.9%  NaCl infusion  Intravenous Continuous   temazepam (RESTORIL) cap 15 mg 15 mg Oral Nightly PRN   DOBUTamine in D5W (DOBUTREX) 250 mg/250 ml infusion 2.5-10 mcg/kg/min Intravenous Contin NEPHRO-LIZA (NEPHRO-LIZA) tab 0.8 mg 1 tablet Oral Daily   Alfuzosin HCl ER (UROXATRAL) 24 hr tab 10 mg 10 mg Oral Daily with breakfast   nitroGLYCERIN (NITRODUR) 0.1 MG/HR 1 patch 1 patch Transdermal Daily   dextrose injection 50 mL 50 mL Intravenous PRN 2. Non ST elevation MI. Cath films reviewed. LCX disease is quite severe, large vessel with some haziness suggestive of thrombus in the area.  RCA disease proximally is borderline about 50-60% however the ostial PDA and throughout is severely diseased not r

## 2017-04-28 NOTE — DISCHARGE PLANNING
LINDSEY following up on d/c planning for the patient. LINDSEY was informed that the patient may be ready for d/c today to [facility]. LINDSEY spoke with Jacques Slater at [facility] and they are still working on [employer], but do have the dialysis arranged.   Sh

## 2017-04-28 NOTE — PROGRESS NOTES
Misc. Note    Yahaira Briceno NP  2017  [facility]    Progress Note    Diandra Shelton Patient Status:  Inpatient    10/17/1945 MRN [id number]   Location [facility] 2W/SW Attending [address] Yahaira Khan, [facility] DUYEN left leg dressing C/D/I  Pulmonary:  clear to auscultation  Cardiovascular: S1, S2 NSR  Abdominal: soft, obese abd soft non-tender; + bowel sounds; + flatus; 0 BM   Extremities: extremities normal, atraumatic, no  edema  Pedal Pulses: 2+ and symmetric

## 2017-04-28 NOTE — PROGRESS NOTES
[facility]    Progress Note    Serafin De Luna Patient Status:  Inpatient    10/17/1945 MRN [id number]   Location [facility] 2W/SW Attending [address] Day # 15 PCP Sarmad Rothman MD, MD     Subjective:     Natanael Gray BILT 0.6 04/28/2017   TP 5.6* 04/28/2017   AST 18 04/28/2017   ALT <5* 04/28/2017   PTT 39.0* 04/24/2017   INR 1.7* 04/24/2017   TSH 2.81 04/15/2017   MG 2.4 04/25/2017   PHOS 5.0* 04/27/2017   TROP 0.72* 04/17/2017                         LOURDES TILLMAN

## 2017-04-28 NOTE — PLAN OF CARE
Problem: Diabetes/Glucose Control  Goal: Glucose maintained within prescribed range  INTERVENTIONS:  - Monitor Blood Glucose as ordered  - Assess for signs and symptoms of hyperglycemia and hypoglycemia  - Administer ordered medications to maintain glucose effectiveness of vasoactive medications to optimize hemodynamic stability  - Monitor arterial and/or venous puncture sites for bleeding and/or hematoma  - Assess quality of pulses, skin color and temperature  - Assess for signs of decreased coronary artery social influences on pain and pain management  - Manage/alleviate anxiety  - Utilize distraction and/or relaxation techniques  - Monitor for opioid side effects  - Notify MD/LIP if interventions unsuccessful or patient reports new pain   Outcome: Progressi Progressing    Problem: HEMATOLOGIC - ADULT  Goal: Maintains hematologic stability  INTERVENTIONS  - Assess for signs and symptoms of bleeding or hemorrhage  - Monitor labs and vital signs for trends  - Administer supportive blood products/factors, fluids

## 2017-04-28 NOTE — CM/SW NOTE
CTL update on discharge plan for today 4/28. ASHLEIGH Sotelo has orders to discharge to [facility] today. CTL met with patient's family to inform of d/c for today. Explained [employer] and fee of $30 flat fee + $3.00 per mile.   Family agreed with cost.

## 2017-04-28 NOTE — PHYSICAL THERAPY NOTE
PHYSICAL THERAPY TREATMENT NOTE - INPATIENT    Room Number: 179/662-E       Presenting Problem: s/p CABG    Problem List  Principal Problem:    Acute chest pain  Active Problems:    Hyperglycemia    Anemia    Acute on chronic congestive heart failure, uns hello    OBJECTIVE  Precautions: Sternal;Chest tube    WEIGHT BEARING RESTRICTION  Weight Bearing Restriction: None       PAIN ASSESSMENT   Rating: Unable to rate         BALANCE with assist to his trunk and B LE. Goal #2 Patient is able to demonstrate transfers Sit to/from Stand at assistance level: supervision with walker - rolling following sternal precautions     Goal #2  Current Status Mod A for sit to stand transfers.  The p

## 2017-04-30 ENCOUNTER — HOSPITAL ENCOUNTER (INPATIENT)
Facility: HOSPITAL | Age: 72
LOS: 20 days | Discharge: HOME HEALTH | DRG: 871 | End: 2017-05-20
Attending: EMERGENCY MEDICINE | Admitting: INTERNAL MEDICINE
Payer: MEDICAID

## 2017-04-30 ENCOUNTER — APPOINTMENT (OUTPATIENT)
Dept: GENERAL RADIOLOGY | Facility: HOSPITAL | Age: 72
DRG: 871 | End: 2017-04-30
Attending: EMERGENCY MEDICINE
Payer: MEDICAID

## 2017-04-30 DIAGNOSIS — R09.02 HYPOXIA: ICD-10-CM

## 2017-04-30 DIAGNOSIS — J90 PLEURAL EFFUSION: Primary | ICD-10-CM

## 2017-04-30 DIAGNOSIS — J18.9 SEPSIS DUE TO PNEUMONIA (HCC): ICD-10-CM

## 2017-04-30 DIAGNOSIS — A41.9 SEPSIS DUE TO PNEUMONIA (HCC): ICD-10-CM

## 2017-04-30 DIAGNOSIS — R35.1 NOCTURIA: ICD-10-CM

## 2017-04-30 DIAGNOSIS — N32.81 OAB (OVERACTIVE BLADDER): ICD-10-CM

## 2017-04-30 DIAGNOSIS — R35.0 URINARY FREQUENCY: ICD-10-CM

## 2017-04-30 DIAGNOSIS — R39.15 URINARY URGENCY: ICD-10-CM

## 2017-04-30 PROCEDURE — 71010 XR CHEST AP PORTABLE  (CPT=71010): CPT

## 2017-04-30 RX ORDER — CHLORPROMAZINE HYDROCHLORIDE 25 MG/1
25 TABLET, FILM COATED ORAL 4 TIMES DAILY
Status: ON HOLD | COMMUNITY
End: 2017-05-20

## 2017-05-01 ENCOUNTER — APPOINTMENT (OUTPATIENT)
Dept: CT IMAGING | Facility: HOSPITAL | Age: 72
DRG: 871 | End: 2017-05-01
Attending: INTERNAL MEDICINE
Payer: MEDICAID

## 2017-05-01 ENCOUNTER — PRIOR ORIGINAL RECORDS (OUTPATIENT)
Dept: OTHER | Age: 72
End: 2017-05-01

## 2017-05-01 ENCOUNTER — APPOINTMENT (OUTPATIENT)
Dept: CV DIAGNOSTICS | Facility: HOSPITAL | Age: 72
DRG: 871 | End: 2017-05-01
Attending: INTERNAL MEDICINE
Payer: MEDICAID

## 2017-05-01 ENCOUNTER — APPOINTMENT (OUTPATIENT)
Dept: GENERAL RADIOLOGY | Facility: HOSPITAL | Age: 72
DRG: 871 | End: 2017-05-01
Attending: INTERNAL MEDICINE
Payer: MEDICAID

## 2017-05-01 PROCEDURE — 99291 CRITICAL CARE FIRST HOUR: CPT | Performed by: INTERNAL MEDICINE

## 2017-05-01 PROCEDURE — 93306 TTE W/DOPPLER COMPLETE: CPT

## 2017-05-01 PROCEDURE — 5A1D60Z PERFORMANCE OF URINARY FILTRATION, MULTIPLE: ICD-10-PCS | Performed by: INTERNAL MEDICINE

## 2017-05-01 PROCEDURE — 71260 CT THORAX DX C+: CPT

## 2017-05-01 PROCEDURE — 93306 TTE W/DOPPLER COMPLETE: CPT | Performed by: INTERNAL MEDICINE

## 2017-05-01 PROCEDURE — 71010 XR CHEST AP PORTABLE  (CPT=71010): CPT

## 2017-05-01 RX ORDER — AMLODIPINE BESYLATE 10 MG/1
10 TABLET ORAL DAILY
Status: DISCONTINUED | OUTPATIENT
Start: 2017-05-01 | End: 2017-05-20

## 2017-05-01 RX ORDER — CLOPIDOGREL BISULFATE 75 MG/1
75 TABLET ORAL DAILY
Status: DISCONTINUED | OUTPATIENT
Start: 2017-05-01 | End: 2017-05-01

## 2017-05-01 RX ORDER — ALBUMIN (HUMAN) 12.5 G/50ML
100 SOLUTION INTRAVENOUS AS NEEDED
Status: DISCONTINUED | OUTPATIENT
Start: 2017-05-01 | End: 2017-05-12 | Stop reason: ALTCHOICE

## 2017-05-01 RX ORDER — GLIMEPIRIDE 2 MG/1
1 TABLET ORAL
Status: DISCONTINUED | OUTPATIENT
Start: 2017-05-01 | End: 2017-05-02

## 2017-05-01 RX ORDER — ASPIRIN 81 MG/1
81 TABLET ORAL DAILY
Status: DISCONTINUED | OUTPATIENT
Start: 2017-05-01 | End: 2017-05-20

## 2017-05-01 RX ORDER — CETIRIZINE HYDROCHLORIDE 10 MG/1
5 TABLET ORAL DAILY
Status: DISCONTINUED | OUTPATIENT
Start: 2017-05-02 | End: 2017-05-20

## 2017-05-01 RX ORDER — ALFUZOSIN HYDROCHLORIDE 10 MG/1
10 TABLET, EXTENDED RELEASE ORAL
Status: DISCONTINUED | OUTPATIENT
Start: 2017-05-01 | End: 2017-05-20

## 2017-05-01 RX ORDER — SODIUM CHLORIDE 9 MG/ML
INJECTION, SOLUTION INTRAVENOUS
Status: COMPLETED
Start: 2017-05-01 | End: 2017-05-01

## 2017-05-01 RX ORDER — PANTOPRAZOLE SODIUM 40 MG/1
40 TABLET, DELAYED RELEASE ORAL
Status: DISCONTINUED | OUTPATIENT
Start: 2017-05-01 | End: 2017-05-20

## 2017-05-01 RX ORDER — ALBUMIN (HUMAN) 12.5 G/50ML
100 SOLUTION INTRAVENOUS AS NEEDED
Status: DISCONTINUED | OUTPATIENT
Start: 2017-05-01 | End: 2017-05-01

## 2017-05-01 RX ORDER — FOLIC ACID/VIT B COMPLEX AND C 0.8 MG
1 TABLET ORAL DAILY
Status: DISCONTINUED | OUTPATIENT
Start: 2017-05-01 | End: 2017-05-20

## 2017-05-01 RX ORDER — 0.9 % SODIUM CHLORIDE 0.9 %
VIAL (ML) INJECTION
Status: COMPLETED
Start: 2017-05-01 | End: 2017-05-01

## 2017-05-01 RX ORDER — FUROSEMIDE 20 MG/1
20 TABLET ORAL DAILY
Status: DISCONTINUED | OUTPATIENT
Start: 2017-05-01 | End: 2017-05-01

## 2017-05-01 RX ORDER — IPRATROPIUM BROMIDE AND ALBUTEROL SULFATE 2.5; .5 MG/3ML; MG/3ML
3 SOLUTION RESPIRATORY (INHALATION) EVERY 6 HOURS PRN
Status: DISCONTINUED | OUTPATIENT
Start: 2017-05-01 | End: 2017-05-20

## 2017-05-01 RX ORDER — ATORVASTATIN CALCIUM 40 MG/1
40 TABLET, FILM COATED ORAL NIGHTLY
Status: DISCONTINUED | OUTPATIENT
Start: 2017-05-01 | End: 2017-05-20

## 2017-05-01 RX ORDER — IPRATROPIUM BROMIDE AND ALBUTEROL SULFATE 2.5; .5 MG/3ML; MG/3ML
3 SOLUTION RESPIRATORY (INHALATION) EVERY 6 HOURS PRN
Status: DISCONTINUED | OUTPATIENT
Start: 2017-05-01 | End: 2017-05-01

## 2017-05-01 RX ORDER — HYDROCODONE BITARTRATE AND ACETAMINOPHEN 5; 325 MG/1; MG/1
2 TABLET ORAL EVERY 4 HOURS PRN
Status: DISCONTINUED | OUTPATIENT
Start: 2017-05-01 | End: 2017-05-20

## 2017-05-01 RX ORDER — LEVOTHYROXINE SODIUM 0.03 MG/1
25 TABLET ORAL
Status: DISCONTINUED | OUTPATIENT
Start: 2017-05-01 | End: 2017-05-20

## 2017-05-01 RX ORDER — CHLORPROMAZINE HYDROCHLORIDE 25 MG/1
25 TABLET, FILM COATED ORAL 4 TIMES DAILY
Status: DISCONTINUED | OUTPATIENT
Start: 2017-05-01 | End: 2017-05-14

## 2017-05-01 RX ORDER — ASCORBIC ACID 500 MG
500 TABLET ORAL 3 TIMES DAILY
Status: DISCONTINUED | OUTPATIENT
Start: 2017-05-01 | End: 2017-05-20

## 2017-05-01 RX ORDER — ISOSORBIDE MONONITRATE 20 MG/1
20 TABLET ORAL DAILY
Status: DISCONTINUED | OUTPATIENT
Start: 2017-05-01 | End: 2017-05-20

## 2017-05-01 RX ORDER — OYSTER SHELL CALCIUM WITH VITAMIN D 500; 200 MG/1; [IU]/1
TABLET, FILM COATED ORAL DAILY
Status: DISCONTINUED | OUTPATIENT
Start: 2017-05-01 | End: 2017-05-20

## 2017-05-01 RX ORDER — DEXTROSE MONOHYDRATE 25 G/50ML
50 INJECTION, SOLUTION INTRAVENOUS AS NEEDED
Status: DISCONTINUED | OUTPATIENT
Start: 2017-05-01 | End: 2017-05-20

## 2017-05-01 NOTE — ED INITIAL ASSESSMENT (HPI)
EMS states pt was called for difficulty breathing. EMS states that pt seemed to be breathing fine upon arrival.  Family states that pt has been lethargic lately and had a syncopal episode today. Details of events were not known. Pt is a poor historian.

## 2017-05-01 NOTE — RESPIRATORY THERAPY NOTE
Respiratory Therapy Rapid Response Note:    Patient assessment:    RRT called for respiratory distress? yes  Breathing pattern: Normal  Patient currently being seen by Respiratory Care? yes    RT interventions necessary? yes    Oxygen applied/increased?  ye

## 2017-05-01 NOTE — ED NOTES
MRSA screen done prior to sending pt to floor. No IV NS bolus given as per sepsis protocol probably due to renal failure.

## 2017-05-01 NOTE — PROGRESS NOTES
[facility] dosing service    Initial Pharmacokinetic Consult for Vancomycin Dosing     Tonny Kong is a 66year old male admitted on 4/30 who is being treated for sepsis.   Pharmacy has been asked to dose Vancomycin by Dr. Bonnie Tena    He has No Known Al Specimen Information: Blood from Blood,peripheral            Radiology: cxr - sent    Based on the above:    1.  This patient will receive a loading dose of Vancomycin  1 gm IVPB x 1 dose in ER at 22:47 then give an additional 750mg ivpb x 1 dose asap  (25m

## 2017-05-01 NOTE — ED PROVIDER NOTES
Patient Seen in: [facility] AND [facility] Emergency Department    History   Patient presents with:  Shortness Of Breath    Stated Complaint: Difficulty breathing    HPI  Patient is a 49-year-old male status post CABG who presents from rehab center with myrna 0.8 mg by mouth daily. Esomeprazole Magnesium 40 MG Oral Capsule Delayed Release,  Take 40 mg by mouth every morning before breakfast.   tamsulosin HCl 0.4 MG Oral Cap,  Take 0.4 mg by mouth daily.    glimepiride 1 MG Oral Tab,  Take 1 mg by mouth daily w BUN/CREA Ratio 9.7 (*)     Calculated Osmolality 302 (*)     GFR, Non- 8 (*)     GFR, -American 10 (*)     All other components within normal limits   TROPONIN I, 0 HOUR - Abnormal; Notable for the following:     Troponin 0.78 (*) layering pleural effusion, but limited evaluation of the lung parenchyma. Probable small right pleural effusion. Cardiomegaly. Results faxed    Kathleen Olea M.D.       Radiology exams  Viewed and reviewed by myself and findings discussed with bobbi

## 2017-05-01 NOTE — H&P
[facility]    PATIENT'S NAME: Aleksandra Bajwa   ATTENDING PHYSICIAN: Tori Capone MD   PATIENT ACCOUNT#:   [id number]    LOCATION:  [address] [place] [address] #:   [id number]       YOB: 1945  ADMISSION DATE:       04/30/ MEDICAL HISTORY:  Patient recently had coronary artery bypass surgery, 4-vessel, by Dr. Abril Alexander; postoperatively, patient did well. Also, patient has end-stage renal disease, on hemodialysis.   Before discharge from the last admission, patient was in stabl examination and rectal examination are deferred. EXTREMITIES:  No calf tenderness. Mild left leg edema after open heart surgery from the vein graft. NEUROLOGIC:  Intact, without any focal deficit. ASSESSMENT AND PLAN:  Pneumonia with possible sepsis.

## 2017-05-01 NOTE — PROGRESS NOTES
[facility]    Progress Note    Oneyda Livers Patient Status:  Inpatient    10/17/1945 MRN [id number]   Location [facility] 3W/SW Attending Karla Benitez MD   Hosp Day # 1 PCP Chaparro Aguilar MD, MD     Subjective:     Cons CO2 20* 05/01/2017   * 05/01/2017   CA 8.4* 05/01/2017   ALB 3.3* 05/01/2017   ALKPHO 52 05/01/2017   BILT 1.1 05/01/2017   TP 6.4 05/01/2017   AST 20 05/01/2017   ALT <5* 05/01/2017   PTT 39.0* 04/24/2017   INR 1.5* 04/28/2017   TSH 0.95 05/01/20

## 2017-05-01 NOTE — PROGRESS NOTES
Cardiology:  Patient re- evaluated. He is doing better. Echo showed normal EF, he has mild to moderate pericardial effusion with no sign of tamponade. In NH, patient had PAF, and he started on eliquis. Eliquis stopped due to pericardial effusion.   CT ch

## 2017-05-01 NOTE — PROGRESS NOTES
Calcium-Phosphorus-Vitamin D [phone] MG-MG-UNIT CHEW  is Non-Formulary Medication &  Auto-Substituted to Calcium with Vitamin D 500-200 tab  Per P&T PROTOCOL

## 2017-05-01 NOTE — PROGRESS NOTES
[facility]    Brief Note    Nawaf Walls Patient Status:  Inpatient    10/17/1945 MRN [id number]   Location [facility] 3W/SW Attending Jocelyne Quinonez MD   Hosp Day # 1 PCP Andrea Al MD, MD     Date of Note:  2017

## 2017-05-01 NOTE — PLAN OF CARE
Patient/Family Goals    • Patient/Family Short Term Goal Adequate for Discharge          Patient/Family Goals    • Patient/Family Long Term Goal Not Progressing          CARDIOVASCULAR - ADULT    • Maintains optimal cardiac output and hemodynamic stability

## 2017-05-01 NOTE — CONSULTS
Patient is a 66year old male who was admitted to the hospital for Pleural effusion:    66year old male admitted to [address] [place] [address] for increasing shortness of breath. Patient was recently hospitalized at [address] [place] [address] and s/p CABG.  He states that he has been more short of isosorbide mononitrate (ISMO,MONOKET) tab 20 mg 20 mg Oral Daily   Levothyroxine Sodium (SYNTHROID, LEVOTHROID) tab 25 mcg 25 mcg Oral Before breakfast   metoprolol Tartrate (LOPRESSOR) tab 12.5 mg 12.5 mg Oral 2x Daily(Beta Blocker)   NEPHRO-LIZA (NEPHRO- Esomeprazole Magnesium 40 MG Oral Capsule Delayed Release Take 40 mg by mouth every morning before breakfast.   tamsulosin HCl 0.4 MG Oral Cap Take 0.4 mg by mouth daily.    glimepiride 1 MG Oral Tab Take 1 mg by mouth daily with breakfast.       Allergies: 2. POD CABG LIMA to LAD, SVG sequential to OM and distal circumflex artery, SVG to rPDA    3. Non ST elevation MI. Cath films reviewed. LCX disease is quite severe, large vessel with some haziness suggestive of thrombus in the area.  RCA disease proximally

## 2017-05-01 NOTE — CONSULTS
[facility]    Report of Consultation    Tyree Gee Patient Status:  Inpatient    10/17/1945 MRN [id number]   Location [facility] 3W/SW Attending Suzette Randolph MD   Hosp Day # 1 PCP Beny Al MD, MD     Date of Admis History  Past Medical History   Diagnosis Date   • Diabetes [facility])    • Essential hypertension    • Kidney disease    • AV fistula (HCC)    • Thyroid disease    • High blood pressure    • Dialysis patient [facility])        Past Surgical History      Past Surgica HCl (VANCOCIN) 500 mg ivpb- Pharmacy dosing after HD and random level 500 mg Intravenous See Admin Instructions (RX holding)   Albumin Human (ALBUMINAR) 25 % solution 100 mL 100 mL Intravenous PRN   Sodium Chloride 0.9 % solution          Prescriptions raheem 8\" (1.727 m), weight 148 lb 4.8 oz (67.268 kg), SpO2 96 %.     HEENT atraumatic normocephalic, pupils equal reactive to light and accommodation, anicteric sclera  Neck supple no JVD no lymphadenopathy and the trachea midline  Chest symmetrical expansion of 60%  8–A.  fib started recently on Eliquis  9– other chronic medical problems include diabetes mellitus, hypertension, dyslipidemia    Plan  Transferred the patient to ICU  Sepsis protocol  Zosyn and vancomycin  Blood culture ×2  Oxygen therapy and BiPAP if

## 2017-05-01 NOTE — PROGRESS NOTES
[facility]      Sepsis Reassessment Note    /60 mmHg  Pulse 67  Temp(Src) 100.7 °F (38.2 °C) (Temporal)  Resp 19  Ht 5' 8\" (1.727 m)  Wt 148 lb 4.8 oz (67.268 kg)  BMI 22.55 kg/m2  SpO2 96%     5:13 PM    Cardiac:  Regularity: Regu

## 2017-05-01 NOTE — PROGRESS NOTES
[facility] Pharmacy Note:  Renal Adjustment for Zosyn (piperacillin/tazobactam)    Lali Verduzco is a 66year old male who has been prescribed Zosyn (piperacillin/tazobactam) 3.375gm every 8 hrs.   CrCl is estimated creatinine clearance is 9.1 mL/min (based on

## 2017-05-01 NOTE — PAYOR COMM NOTE
ED Provider Notes by Aneita Councilman, MD at 4/30/2017  9:10 PM      Author: Aneita Councilman, MD Service: (none) Author Type: Physician     Filed: 4/30/2017 10:34 PM Note Time: 4/30/2017  9:10 PM Status: Addendum     : Aneita Councilman, MD (Physi daily.   Vitamin C 500 MG Oral Tab,  Take 1 tablet (500 mg total) by mouth 3 (three) times daily.   Cetirizine HCl 5 MG Oral Tab,  Take by mouth daily.   Calcium-Phosphorus-Vitamin D (CITRACAL +D3 OR),  Take 1 tablet by mouth daily.   isosorbide mononitrat distension  Extremities: +left arm AVF. +right arm PICC. no cyanosis/clubbing/edema  Neuro: follows commands, limited speech. CN intact. Some movement of extremities  SKIN: hot, warm to touch.  No rashes          ED Course        Labs Reviewed    COMP METAB view results for these tests on the individual orders.    [facility]       EKG    Rate, intervals and axes as noted on EKG Report.   Rate: 96  Rhythm: Sinus Rhythm  Reading: normal               MDM

## 2017-05-01 NOTE — PROGRESS NOTES
[facility] - [facility]    Brief Note    Herve Burt Patient Status:  Inpatient    10/17/1945 MRN [id number]   Location [facility] 2W/SW Attending Donaldo Harrell MD   Hosp Day # 1 PCP Myriam Molina MD, MD     Date of Note:  2017

## 2017-05-01 NOTE — CONSULTS
[facility]    Report of Consultation    Adan Links Patient Status:  Inpatient    10/17/1945 MRN [id number]   Location [facility] 3W/SW Attending Rosario Parrish MD   Hosp Day # 1 PCP Mayo Hurley MD, MD     Date of Admis EC tab 5 mg 5 mg Oral Daily PRN   Calcium Carbonate-Vitamin D (OSCAL-500) 500-200 MG-UNIT per tab  Oral Daily   [START ON 5/2/2017] cetirizine (ZYRTEC) tab 5 mg 5 mg Oral Daily   ChlorproMAZINE HCl (THORAZINE) tab 25 mg 25 mg Oral QID   Pantoprazole Sodium MG Oral Tab Take 1 tablet (500 mg total) by mouth 3 (three) times daily. Cetirizine HCl 5 MG Oral Tab Take by mouth daily. Calcium-Phosphorus-Vitamin D (CITRACAL +D3 OR) Take 1 tablet by mouth daily.    isosorbide mononitrate 20 MG Oral Tab Take 20 mg b 05/01/2017   MG 2.4 04/25/2017   PHOS 5.0* 04/27/2017   TROP 0.78* 04/30/2017         Imaging:          Assessment & Plan :    ESRD on [facility]HD   HD due today , ordered. Anemia in ESRD   Hgb noted. Will rder AMELIE 3 x week. Pleural effusion  Dr DANTE Champion End

## 2017-05-01 NOTE — PROGRESS NOTES
Patient was readmitted from rehab nursing home due to respiratory distress. He was receiving dialysis but only up to about 2 hours a treatment instead of 3-1/2 because he refused complete sessions.   It appears she has got into respiratory difficulty due t and if he is not obtaining adequate nutrition orally we will have a feeding tube placed.

## 2017-05-01 NOTE — PLAN OF CARE
RRT    *See RRT Documentation Record*    Reason the RRT was called: SOB   Assessment of patient leading up to RRT: TEMP-98.1, HR-107, BP-157/79, RR-24. SPO2-96% WITH 4L/NC  Interventions/Testing: ABG, NEB TREATMENT. Luis Eduardo Vincent, DR. Mary Molina AND

## 2017-05-02 ENCOUNTER — APPOINTMENT (OUTPATIENT)
Dept: ULTRASOUND IMAGING | Facility: HOSPITAL | Age: 72
DRG: 871 | End: 2017-05-02
Attending: INTERNAL MEDICINE
Payer: MEDICAID

## 2017-05-02 ENCOUNTER — APPOINTMENT (OUTPATIENT)
Dept: GENERAL RADIOLOGY | Facility: HOSPITAL | Age: 72
DRG: 871 | End: 2017-05-02
Attending: INTERNAL MEDICINE
Payer: MEDICAID

## 2017-05-02 PROCEDURE — 71010 XR CHEST AP PORTABLE  (CPT=71010): CPT

## 2017-05-02 PROCEDURE — 76604 US EXAM CHEST: CPT

## 2017-05-02 PROCEDURE — 99233 SBSQ HOSP IP/OBS HIGH 50: CPT | Performed by: INTERNAL MEDICINE

## 2017-05-02 RX ORDER — GLIMEPIRIDE 2 MG/1
1 TABLET ORAL
Status: DISCONTINUED | OUTPATIENT
Start: 2017-05-02 | End: 2017-05-03

## 2017-05-02 RX ORDER — 0.9 % SODIUM CHLORIDE 0.9 %
VIAL (ML) INJECTION
Status: COMPLETED
Start: 2017-05-02 | End: 2017-05-02

## 2017-05-02 RX ORDER — ZOLPIDEM TARTRATE 5 MG/1
5 TABLET ORAL NIGHTLY PRN
Status: DISCONTINUED | OUTPATIENT
Start: 2017-05-02 | End: 2017-05-20

## 2017-05-02 RX ORDER — ALBUMIN (HUMAN) 12.5 G/50ML
100 SOLUTION INTRAVENOUS AS NEEDED
Status: DISCONTINUED | OUTPATIENT
Start: 2017-05-03 | End: 2017-05-12 | Stop reason: ALTCHOICE

## 2017-05-02 NOTE — DIETARY NOTE
ADULT NUTRITION INITIAL ASSESSMENT    Pt is at moderate nutrition risk. Pt does not meet malnutrition criteria. RECOMMENDATIONS TO MD:   RD providing TID supplements, adjusted diet for low k+, norman count in progress.      NUTRITION DIAGNOSIS/PROBLEM: setting or provider: Pt from rehab pta.      ADMITTING DIAGNOSIS: Pleural effusion [J90]  Hypoxia [R09.02]  Sepsis due to pneumonia ([facility] [address] [J18.9, A41.9]      PERTINENT PAST MEDICAL HISTORY:  Past Medical History   Diagnosis Date   • Diabetes ([facility] [address]    • Mariela lower extremity edema non-pitting,per visual exam  .  - Skin Integrity: surgical wounds. (chest, leg)  NUTRITION PRESCRIPTION:  Diet: Puree low k+ (adjusted today)  Oral Supplements Nepro , sugar-free shake  NUTRITION NEEDS:   Calories: 1871-9240 calories/d

## 2017-05-02 NOTE — PAYOR COMM NOTE
Attending Physician: Deirdre Ortiz MD    Review Type: ADMISSION   Reviewer: Celestina Faria       Date: May 2, 2017 - 11:08 AM  Payor: [facility] [facility]  Authorization Number: N/A  Admit date: 4/30/2017  8:19 PM   Admitted from Emergency Dept.:        ED Proide Oral Tab,  Take 2 tablets by mouth every 4 (four) hours as needed for Pain. aspirin 81 MG Oral Tab EC,  Take 1 tablet (81 mg total) by mouth daily. Clopidogrel Bisulfate (PLAVIX) 75 MG Oral Tab,  Take 1 tablet (75 mg total) by mouth daily.    Vitamin C following commands  HEENT: MMM, EOMI, PERRL  Neck: supple, non tender. +JVD  CV: RRR, no murmurs  Resp: CTAB, no wheezes or retractions. +chest wall scar, no drainage  Ab: soft, nontender, no distension  Extremities: +left arm AVF. +right arm PICC.  no cyan DIFFERENTIAL[[id number]]          Abnormal            Final result                 Please view results for these tests on the individual orders.    URINALYSIS WITH CULTURE REFLEX   BLOOD CULTURE   BLOOD CULTURE      EKG    Rate, intervals and axes as noted o List                     Signed by Becky Paul MD on 4/30/2017 10:34 PM      ED Provider Notes by Becky Paul MD at 4/30/2017  9:10 PM  Version 1 of 2    Author:  Becky Paul MD Service:  (none) Author Type:  Physician    Filed:  4/30/ Tab,  Take 1 tablet (75 mg total) by mouth daily. Vitamin C 500 MG Oral Tab,  Take 1 tablet (500 mg total) by mouth 3 (three) times daily. Cetirizine HCl 5 MG Oral Tab,  Take by mouth daily.    Calcium-Phosphorus-Vitamin D (CITRACAL +D3 OR),  Take 1 tab distension  Extremities: +left arm AVF. +right arm PICC. no cyanosis/clubbing/edema  Neuro: follows commands, limited speech. CN intact. Some movement of extremities  SKIN: hot, warm to touch.  No rashes        ED Course     Labs Reviewed   COMP METABOLIC P Ox: 91%, Abnormal, RA    Cardiac Monitor: Pulse Readings from Last 1 Encounters:  04/30/17 : 94  , sinus, normal     Radiology findings:    1 VIEW CXR 21:19      IMPRESSION:  Low lung volumes.  Blunting of the left costophrenic angle, compatible with small [facility]    PATIENT'S NAME: Elo Lopez   ATTENDING PHYSICIAN: Mohammed M. Peri Bosworth, MD   PATIENT ACCOUNT#:   [id number]    LOCATION:  [address] [place] [address] #:   [id number]       YOB: 1945  ADMISSION DATE:       04 MEDICAL HISTORY:  Patient recently had coronary artery bypass surgery, 4-vessel, by Dr. Galen Chen; postoperatively, patient did well. Also, patient has end-stage renal disease, on hemodialysis.   Before discharge from the last admission, patient was in stabl examination and rectal examination are deferred. EXTREMITIES:  No calf tenderness. Mild left leg edema after open heart surgery from the vein graft. NEUROLOGIC:  Intact, without any focal deficit. ASSESSMENT AND PLAN:  Pneumonia with possible sepsis. Status:  Not on file.     Other Topics            Concern    None on file    Social History Narrative    None on file             Current Medications:    Current Facility-Administered Medications:  Piperacillin Sod-Tazobactam So (ZOSYN) 3.375 g in dextrose        Prescriptions Prior to Admission       Prescriptions prior to admission:  ChlorproMAZINE HCl 25 MG Oral Tab  Take 25 mg by mouth 4 (four) times daily.    apixaban 2.5 MG Oral Tab  Take 2.5 mg by mouth 2 (two) times daily.    atorvastatin 40 MG Oral 3878    Gross per 24 hour    Intake     770 ml    Output     100 ml    Net     670 ml      Wt Readings from Last 1 Encounters:  05/01/17 : 148 lb 4.8 oz (67.268 kg)      General appearance: moderate distress and pale  Pulmonary:  diminished breath sounds b felt the patient needed to be done during the same admission.   We are seeing the negative effects of this now with a malnourished patient refusing to eat and refusing complete dialysis with kidneys that have not yet stabilized and extensive uremia and jeaneth fistula, hypothyroidism, hyperlipidemia, coronary artery disease  Patient underwent coronary artery bypass graft on April 24, 2017 and he was discharged to rehab 2 days ago  He was started on Eliquis postop for A. fib  Patient presented to [facility] Medications:  Piperacillin Sod-Tazobactam So (ZOSYN) 3.375 g in dextrose 5 % 100 mL IVPB  3.375 g  Intravenous  Q12H    dextrose injection 50 mL  50 mL  Intravenous  PRN    Glucose-Vitamin C (DEX-4) 4-0.006 g chewable tab 4 tablet  4 tablet  Oral  Q15 Min atorvastatin 40 MG Oral Tab  Take 1 tablet (40 mg total) by mouth nightly.    metoprolol Tartrate 25 MG Oral Tab  Take 0.5 tablets (12.5 mg total) by mouth 2x Daily(Beta Blocker).     AmLODIPine Besylate 10 MG Oral Tab  Take 1 tablet (10 mg total) by mout lung exam crackles all over the left side slightly diminished in the left base with no wheezes or rhonchi    Heart S1-S2 regular tacky 2/6 systolic murmur with no gallop  Abdominal exam soft benign no distention no tenderness no guarding or rebound no orga of the chest rule out PE discussed with Dr. Burley Cowden okay to give contrast he just had dialysis  On the chest x-ray difficult to evaluate if he has left-sided effusion or just parenchymal infiltrate also the CT of the chest will give better evaluation if the atelectasis and left pleural effusion     thoracentesis left side today    Encourage pulmonary toilet and IS    Increase activity    Antibiotics      3– sepsis  Hemodynamic stable    Normal lactic    cpm with antibiotics     4- debility / deconditioning   consolidation. This also is significantly worse than on the earlier postop study. 3. Questionable minimal opacity in the right base possibly mild atelectasis.  The patient is scheduled for a CT of the chest later today.         Xr Chest Ap Portable  (cpt=71

## 2017-05-02 NOTE — PLAN OF CARE
Problem: Diabetes/Glucose Control  Goal: Glucose maintained within prescribed range  INTERVENTIONS:  - Monitor Blood Glucose as ordered  - Assess for signs and symptoms of hyperglycemia and hypoglycemia  - Administer ordered medications to maintain glucose oxygenation and minimize respiratory effort  - Oxygen supplementation based on oxygen saturation or ABGs  - Provide Smoking Cessation handout, if applicable  - Encourage broncho-pulmonary hygiene including cough, deep breathe, Incentive Spirometry  - Asses

## 2017-05-02 NOTE — PROGRESS NOTES
Dr. Angel Burrows called back and stated that 2nd and 3rd lactic acid level doesn't need to be done.

## 2017-05-02 NOTE — PLAN OF CARE
Text paged Dr. Keen Arm that the 2nd and 3rd lactic acids were missed and the first one was 1.4. Stated to call back if he wanted us to draw one now. No call back. Pt's VSS.  Monitoring

## 2017-05-02 NOTE — PAYOR COMM NOTE
D/C'D 4/28  LINDSEY following up on d/c planning for the patient.  LINDSEY was informed that the patient may be ready for d/c today to [facility] spoke with Brooke Middleton at [facility] and they are still working on [employer], but do have the dialysis a famoTIDine (PEPCID) tab 20 mg  20 mg  Oral  Daily    Normal Saline Flush 0.9 % injection 10 mL  10 mL  Intravenous  PRN    Albumin Human (ALBUMINAR) 25 % solution 100 mL  100 mL  Intravenous  PRN    metoprolol Tartrate (LOPRESSOR) tab 12.5 mg  12.5 mg  O (PF) 2 MG/ML injection 2 mg  2 mg  Intravenous  Q2H PRN    Or          morphINE sulfate (PF) 4 MG/ML injection 4 mg  4 mg  Intravenous  Q2H PRN    Or          morphINE sulfate (PF) 4 MG/ML injection 8 mg  8 mg  Intravenous  Q2H PRN    Clopidogrel Bisulfate 6.92  04/28/2017    BUN  74  04/28/2017    NA  132  04/28/2017    K  4.1  04/28/2017    CL  98  04/28/2017    CO2  21  04/28/2017    GLU  96  04/28/2017    CA  8.1  04/28/2017    ALB  3.1  04/28/2017    ALKPHO  37  04/28/2017    BILT  0.6  04/28/2017    TP  1-5 Units  Subcutaneous  TID CC    •  insulin aspart   2 Units  Subcutaneous  TID CC    •  Metoclopramide HCl   10 mg  Intravenous  Q6H    •  Vitamin C   500 mg  Oral  TID    •  mupirocin   1 Application  Nasal  BID    •  Clopidogrel Bisulfate   75 mg  Or today  Discharge Plan; referral made to [facility] rehab with dialysis; SS following; plan for discharge to rehab after shower.                 Results:      Lab Results  Component  Value  Date    WBC  10.6  04/28/2017    HGB  8.1*  04/28/2017    HCT  24.6*  04/2

## 2017-05-02 NOTE — PROGRESS NOTES
[facility]    Progress Note    [facility] Patient Status:  Inpatient    10/17/1945 MRN [id number]   Location [facility] 2W/SW Attending Umberto Johnson MD   Hosp Day # 2 PCP Nicole Cobian MD, MD       Subjective:   Chandra Rodriguez (cpt=71010)    5/2/2017  CONCLUSION:  1. No appreciable change in the chest since yesterday's exam. 2. Persistent left-sided effusion and predominantly basilar atelectasis.  3. Right basilar atelectasis and small effusion not appreciated on this portable st postoperative fluid in the subxiphoid/M. for xiphoid region. 8. Evaluation limited by patient motion artifact. 9. Multiple renal cysts.          Ekg 12-lead    4/30/2017  ECG Report  Interpretation  -------------------------- Sinus Rhythm -Short AL syndrome

## 2017-05-02 NOTE — PROGRESS NOTES
Patient seen in follow up. Feeling better. Vitals reviewed. BP stable. Sating 97-99%, heart rate 50-70s  Labs reviewed K 3.7, Creatinine better at 4.35, WBC 10.8, Hgb 9  On abx  Echo small to moderate pericardial effusion.  No tamponade   CT chest no HYDROcodone-acetaminophen (NORCO) 5-325 MG per tab 2 tablet 2 tablet Oral Q4H PRN   isosorbide mononitrate (ISMO,MONOKET) tab 20 mg 20 mg Oral Daily   Levothyroxine Sodium (SYNTHROID, LEVOTHROID) tab 25 mcg 25 mcg Oral Before breakfast   metoprolol Tartrat Clopidogrel Bisulfate (PLAVIX) 75 MG Oral Tab Take 1 tablet (75 mg total) by mouth daily. Vitamin C 500 MG Oral Tab Take 1 tablet (500 mg total) by mouth 3 (three) times daily. Cetirizine HCl 5 MG Oral Tab Take by mouth daily.    Calcium-Phosphorus-Aura 5/1/2017  CONCLUSION:  1. Moderate left base consolidation and layering left pleural effusion. Small right lung base infiltrate/atelectasis. Followup study advised.     The examination was read on call by [facility] radiology services with a similar interpretat 3. Non ST elevation MI. Cath films reviewed. LCX disease is quite severe, large vessel with some haziness suggestive of thrombus in the area.  RCA disease proximally is borderline about 50-60% however the ostial PDA and throughout is severely diseased not r

## 2017-05-02 NOTE — PROGRESS NOTES
[facility]    Progress Note    Nawaf Walls Patient Status:  Inpatient    10/17/1945 MRN [id number]   Location [facility] 2W/SW Attending Jocelyne Quinonez MD   Hosp Day # 2 PCP Andrea Al MD, MD     Subjective:     Cons hypertension, dyslipidemia , a-fib             Results:     Lab Results  Component Value Date   WBC 10.8 05/02/2017   HGB 9.0* 05/02/2017   HCT 27.0* 05/02/2017    05/02/2017   CREATSERUM 4.35* 05/02/2017   BUN 37* 05/02/2017    05/02/2017   K Em    5/1/2017  CONCLUSION:  1. No evidence for pulmonary embolus. Enlarged main pulmonary artery suggesting pulmonary hypertension. 2. Moderate left sided pericardial effusion.  3. Changes related to coronary bypass including small amounts of retrosternal/

## 2017-05-02 NOTE — DISCHARGE PLANNING
LINDSEY spoke with the patient's daughter at bedside today re d/c planning. Family would like a different nursing home with a private room.  LINDSEY explained that the patient's insurance does not cover a private room at a nursing home and the options with his insur

## 2017-05-03 ENCOUNTER — APPOINTMENT (OUTPATIENT)
Dept: GENERAL RADIOLOGY | Facility: HOSPITAL | Age: 72
DRG: 871 | End: 2017-05-03
Attending: INTERNAL MEDICINE
Payer: MEDICAID

## 2017-05-03 PROCEDURE — 99233 SBSQ HOSP IP/OBS HIGH 50: CPT | Performed by: INTERNAL MEDICINE

## 2017-05-03 PROCEDURE — 71010 XR CHEST AP PORTABLE  (CPT=71010): CPT

## 2017-05-03 RX ORDER — ALBUMIN (HUMAN) 12.5 G/50ML
100 SOLUTION INTRAVENOUS AS NEEDED
Status: DISCONTINUED | OUTPATIENT
Start: 2017-05-05 | End: 2017-05-12 | Stop reason: ALTCHOICE

## 2017-05-03 RX ORDER — HEPARIN SODIUM 5000 [USP'U]/ML
5000 INJECTION, SOLUTION INTRAVENOUS; SUBCUTANEOUS EVERY 8 HOURS SCHEDULED
Status: DISCONTINUED | OUTPATIENT
Start: 2017-05-03 | End: 2017-05-20

## 2017-05-03 RX ORDER — 0.9 % SODIUM CHLORIDE 0.9 %
VIAL (ML) INJECTION
Status: COMPLETED
Start: 2017-05-03 | End: 2017-05-03

## 2017-05-03 RX ORDER — SODIUM CHLORIDE 9 MG/ML
INJECTION, SOLUTION INTRAVENOUS
Status: COMPLETED
Start: 2017-05-03 | End: 2017-05-03

## 2017-05-03 NOTE — DISCHARGE PLANNING
LINDSEY following up on d/c planning for the patient. LINDSEY spoke with Dr. Juliette Mcneill re d/c plans for [employer]. LINDSEY explained the concerns that the family has re the private room, but also the limitations with the patient's insurance.   Plan remains for

## 2017-05-03 NOTE — PROGRESS NOTES
[facility]    Progress Note    Can Parnell Patient Status:  Inpatient    10/17/1945 MRN [id number]   Location [facility] 2W/SW Attending George Benson MD   Hosp Day # 3 PCP Verneita Lennox, MD, MD       Subjective:   Jere Baptiste ultrasound shows small, possibly complex or loculated effusion. 2. Recommend followup chest radiographs and possible decubitus views if clinically important         Xr Chest Ap Portable  (cpt=71010)    5/3/2017  CONCLUSION:  1.  No significant change in the amount of postoperative fluid in the subxiphoid/M. for xiphoid region. 8. Evaluation limited by patient motion artifact. 9. Multiple renal cysts.                 Merced Heaton MD  5/3/2017   HD note    HD started , complete 3 hr Rx    UF goal 1-2 L

## 2017-05-03 NOTE — SLP NOTE
ADULT SWALLOWING EVALUATION    ASSESSMENT & PLAN   ASSESSMENT  Pt seen sitting upright in bed for all PO trials. Family at bedside and provided translation with SLP as necessary.  Pt with good oral acceptance and bilabial seal across all trials without loss Regular; Thin liquids  Precautions: Aspiration    Patient/Family Goals: \"he needs to eat more\"    SWALLOWING HISTORY  Current Diet Consistency: Thin liquids;Puree  Dysphagia History: Pt with no known hx of dysphagia at [facility].  Pt with recent intubation for and thin liquids without overt signs or symptoms of aspiration with 100 % accuracy over 2 session(s).    Goal #4 The patient will utilize compensatory strategies as outlined by  BSSE (clinical evaluation) including Slow rate, Small bites, Small sips, Multip

## 2017-05-03 NOTE — PROGRESS NOTES
Call received from lab notified patient blood sugar is 54. notified Caridad Cee and followed hypoglycemia protocol. Rechecked blood sugar is 101. pt denies any dizziness or sob.

## 2017-05-03 NOTE — PROGRESS NOTES
Patient seen in follow up. No overnight events.    Feeling better  Vitals reviewed BP stable sating 96%  CXR reviewed opacification of the left lung unchanged from previous   Labs reviewed K 3.6, Creatinine 6.58, WBC 13.6, Hgb 8.9          05/03/17  1 cetirizine (ZYRTEC) tab 5 mg 5 mg Oral Daily   ChlorproMAZINE HCl (THORAZINE) tab 25 mg 25 mg Oral QID   Pantoprazole Sodium (PROTONIX) EC tab 40 mg 40 mg Oral QAM AC   HYDROcodone-acetaminophen (NORCO) 5-325 MG per tab 2 tablet 2 tablet Oral Q4H PRN   iso HYDROcodone-acetaminophen 5-325 MG Oral Tab Take 2 tablets by mouth every 4 (four) hours as needed for Pain. aspirin 81 MG Oral Tab EC Take 1 tablet (81 mg total) by mouth daily.    Clopidogrel Bisulfate (PLAVIX) 75 MG Oral Tab Take 1 tablet (75 mg total) 5/1/2017  CONCLUSION:  1. No evidence for pulmonary embolus. Enlarged main pulmonary artery suggesting pulmonary hypertension. 2. Moderate left sided pericardial effusion.  3. Changes related to coronary bypass including small amounts of retrosternal/anteri - Was started on Eliquis 2.5mg BID for anticoagulation at rehab facility     4. ESRD on HD     5. Anemia     6. Healthcare associated pneumonia      Plan  1. Negative for tamponade, PE    2. Off plavix, eliquis.    3.  Will monitor pericardial effusion with

## 2017-05-03 NOTE — PROGRESS NOTES
[facility]    Progress Note    Jess Lei Patient Status:  Inpatient    10/17/1945 MRN [id number]   Location [facility] 2W/SW Attending Js Wen MD   Hosp Day # 3 PCP Genevieve Alvarez MD, MD     Subjective:   Kamari Martinez 137 05/03/2017   K 3.6 05/03/2017    05/03/2017   CO2 22 05/03/2017   GLU 54* 05/03/2017   CA 8.2* 05/03/2017   ALB 2.8* 05/03/2017   ALKPHO 45 05/03/2017   BILT 1.0 05/03/2017   TP 6.0 05/03/2017   AST 39 05/03/2017   ALT 10* 05/03/2017   PTT 32.2 0 to coronary bypass including small amounts of retrosternal/anterior mediastinal gas and small amount of vanessa-sternal fluid. 4. Small moderate left pleural effusion with complete atelectasis of left lower lobe.  5. Small right pleural effusion with mild comp

## 2017-05-03 NOTE — PLAN OF CARE
Problem: PAIN - ADULT  Goal: Verbalizes/displays adequate comfort level or patient’s stated pain goal  INTERVENTIONS:  - Encourage pt to monitor pain and request assistance  - Assess pain using appropriate pain scale  - Administer analgesics based on type deep breathe, Incentive Spirometry  - Assess the need for suctioning and perform as needed  - Assess and instruct to report SOB or any respiratory difficulty  - Respiratory Therapy support as indicated  - Manage/alleviate anxiety  - Monitor for signs/sympt

## 2017-05-03 NOTE — PHYSICAL THERAPY NOTE
PHYSICAL THERAPY EVALUATION - INPATIENT     Room Number: 204/204-A  Evaluation Date: 5/3/2017  Type of Evaluation: Initial  Physician Order: PT Eval and Treat    Presenting Problem: pleural effusion  Reason for Therapy: Mobility Dysfunction and Dischar conservation;Patient education; Family education;Gait training;Stair training;Transfer training; Other (Comment)  Rehab Potential : Good  Frequency (Obs): 5x/week       PHYSICAL THERAPY MEDICAL/SOCIAL HISTORY     History related to current admission: The pt breath  Heart Rate: 98    AM-PAC '6-Clicks' INPATIENT SHORT FORM - BASIC MOBILITY  How much difficulty does the patient currently have. ..  -   Turning over in bed (including adjusting bedclothes, sheets and blankets)?: A Lot   -   Sitting down on and stand level: supervision     Goal #1   Current Status    Goal #2 Patient is able to demonstrate transfers Sit to/from Stand at assistance level: supervision with walker - rolling     Goal #2  Current Status    Goal #3 Patient is able to ambulate 75 feet with ass

## 2017-05-03 NOTE — DIETARY NOTE
NUTRITION NOTE UPDATE:  Monitoring po intake adequacy: Pt met ~75% of nutrition needs last 24 hrs with inclusion of 1 Nepro supplement (475 kcals, 19 gm protein). Today po intake has declined.  2 meals thus far today

## 2017-05-03 NOTE — PROGRESS NOTES
The patient remains afebrile with no signs of sepsis. Original diagnosis of pneumonia more likely related to edema due to inadequate dialysis at rehab. Patient dropped 11 pounds since admission with sufficient dialysis.   His symptoms have improved signif

## 2017-05-03 NOTE — PROGRESS NOTES
[facility]    Progress Note    Colleenlui Gregoryjamie Patient Status:  Inpatient    10/17/1945 MRN [id number]   Location [facility] 2W/SW Attending Lori Nicole MD   Hosp Day # 2 PCP Yvette Morrell MD, MD     Subjective:   Nedra Kennedy 05/02/2017   K 3.7 05/02/2017    05/02/2017   CO2 27 05/02/2017   * 05/02/2017   CA 8.1* 05/02/2017   ALB 3.0* 05/02/2017   ALKPHO 47 05/02/2017   BILT 1.0 05/02/2017   TP 5.9 05/02/2017   AST 24 05/02/2017   ALT 5* 05/02/2017   PTT 32.2 05/01 1. No evidence for pulmonary embolus. Enlarged main pulmonary artery suggesting pulmonary hypertension. 2. Moderate left sided pericardial effusion.  3. Changes related to coronary bypass including small amounts of retrosternal/anterior mediastinal gas and

## 2017-05-03 NOTE — PROGRESS NOTES
[facility]    Progress Note    Joe Ortez Patient Status:  Inpatient    10/17/1945 MRN [id number]   Location [facility] 2W/SW Attending Yanely Bravo MD   Hosp Day # 3 PCP Brad Thao MD, MD     Subjective:     Cons dyslipidemia , a-fib     9- dvt prophylaxis / heparin sq         D/w Dr Galen Chen    d/w family   For now cpm with antibiotics   HD   IS / increase activity   Overall doing well / on room air with 98 % sat   F/u CXR  / ?  Require IR to drain this loculated le and accompanying basilar atelectasis or consolidation. This also is significantly worse than on the earlier postop study. 3. Questionable minimal opacity in the right base possibly mild atelectasis.  The patient is scheduled for a CT of the chest later toda

## 2017-05-04 PROCEDURE — 99233 SBSQ HOSP IP/OBS HIGH 50: CPT | Performed by: INTERNAL MEDICINE

## 2017-05-04 NOTE — DISCHARGE PLANNING
SW met with the patient's son today re d/c planning. We discussed the insurance issues related to the rehab location for the patient.   Family informed that insurance does not cover therapy or a private room, but [facility] is going to provide

## 2017-05-04 NOTE — PLAN OF CARE
Problem: Patient Centered Care  Goal: Patient preferences are identified and integrated in the patient’s plan of care  Interventions:  - What would you like us to know as we care for you? He only wants to do things when asked to be done, at slower pace.

## 2017-05-04 NOTE — PLAN OF CARE
Problem: Diabetes/Glucose Control  Goal: Glucose maintained within prescribed range  INTERVENTIONS:  - Monitor Blood Glucose as ordered  - Assess for signs and symptoms of hyperglycemia and hypoglycemia  - Administer ordered medications to maintain glucose baseline  INTERVENTIONS:  - Continuous cardiac monitoring, monitor vital signs, obtain 12 lead EKG if indicated  - Evaluate effectiveness of antiarrhythmic and heart rate control medications as ordered  - Initiate emergency measures for life threatening ar gravity, serum osmolarity and serum sodium as indicated or ordered  - Monitor response to interventions for patient’s volume status, including labs, urine output, blood pressure (other measures as available)  - Encourage oral intake as appropriate  - Instr

## 2017-05-04 NOTE — PROGRESS NOTES
[place] [facility] - [facility]    Progress Note    Sonali Alonzo Patient Status:  Inpatient    10/17/1945 MRN [id number]   Location [facility] 2W/SW Attending Alyson Restrepo MD   Hosp Day # 4 PCP Janneth Hilario MD, MD       Subjective:   Jacek Lentz effusion. 2. Recommend followup chest radiographs and possible decubitus views if clinically important         Xr Chest Ap Portable  (cpt=71010)    5/3/2017  CONCLUSION:  1. No significant change in the appearance of left lung opacification.

## 2017-05-04 NOTE — PROGRESS NOTES
Patient seen in follow up. Patient denies any chest pain or sob. He has been saturating well. All the records in the chart were reviewed.    05/04/17  0600   BP: 133/41   Pulse: 81   Temp:    Resp: 19       Intake/Output Summary (Last 24 hours) at 0 isosorbide mononitrate (ISMO,MONOKET) tab 20 mg 20 mg Oral Daily   Levothyroxine Sodium (SYNTHROID, LEVOTHROID) tab 25 mcg 25 mcg Oral Before breakfast   metoprolol Tartrate (LOPRESSOR) tab 12.5 mg 12.5 mg Oral 2x Daily(Beta Blocker)   NEPHRO-LIZA (NEPHRO- Vitamin C 500 MG Oral Tab Take 1 tablet (500 mg total) by mouth 3 (three) times daily. Cetirizine HCl 5 MG Oral Tab Take by mouth daily. Calcium-Phosphorus-Vitamin D (CITRACAL +D3 OR) Take 1 tablet by mouth daily.    isosorbide mononitrate 20 MG Oral Ta 3. Non ST elevation MI. Cath films reviewed. LCX disease is quite severe, large vessel with some haziness suggestive of thrombus in the area.  RCA disease proximally is borderline about 50-60% however the ostial PDA and throughout is severely diseased not r

## 2017-05-04 NOTE — PROGRESS NOTES
[facility]     Progress Note        Jess Lei Patient Status:  Inpatient    10/17/1945 MRN [id number]   Location [facility] 2W/SW Attending Js Wen MD   Hosp Day # 4 PCP Genevieve Alvarez MD, MD       Subjective:   Kaleigh Wilson mg Oral QAM AC   HYDROcodone-acetaminophen (NORCO) 5-325 MG per tab 2 tablet 2 tablet Oral Q4H PRN   isosorbide mononitrate (ISMO,MONOKET) tab 20 mg 20 mg Oral Daily   Levothyroxine Sodium (SYNTHROID, LEVOTHROID) tab 25 mcg 25 mcg Oral Before breakfast   m CONCLUSION:  1. Left chest ultrasound shows small, possibly complex or loculated effusion. 2. Recommend followup chest radiographs and possible decubitus views if clinically important         Xr Chest Ap Portable  (cpt=71010)    5/3/2017  CONCLUSION:  1.  Duran Kiser

## 2017-05-04 NOTE — SLP NOTE
SPEECH DAILY NOTE - INPATIENT        ASSESSMENT & PLAN   ASSESSMENT    Pt alert and seen upright in chair with current diet of pureed/nectar-thick liquids for monitoring of diet tolerance. Swallowing precautions/strategies discussed and demonstrated.  Pt a accuracy over 2 session(s). Pt trialed with solid and thin liquids. Lingual skills functional for bolus formation and control. Mastication of solid mildly uncoordinated, requiring additional time.  No overt clinical signs of aspiration on any swallow (no

## 2017-05-04 NOTE — DIETARY NOTE
Nutrition Note update    Calorie count in progress: Diet upgraded to [facility] Soft, Thin liq, no Straw. Kosher diet. Pt does not eat red meat & chicken.    Nutrition needs: 4078-6132 kcal/day and 75-85 g protein/day    5/2: Po intake of ~1400 kcal & 55 g

## 2017-05-04 NOTE — PHYSICAL THERAPY NOTE
Attempted to see pt this PM x 2 attempts. Pt at first asked therapist to come back at 3 pm and then when therapy was attempted at 3pm the pt again refused and was pushing therapist away when attempting to encourage the pt for OOB activity.  Will attempt to

## 2017-05-05 PROCEDURE — 99233 SBSQ HOSP IP/OBS HIGH 50: CPT | Performed by: INTERNAL MEDICINE

## 2017-05-05 RX ORDER — SODIUM CHLORIDE 9 MG/ML
INJECTION, SOLUTION INTRAVENOUS
Status: DISPENSED
Start: 2017-05-05 | End: 2017-05-05

## 2017-05-05 NOTE — PROGRESS NOTES
[facility]    Progress Note    Minervaa Prjamie Patient Status:  Inpatient    10/17/1945 MRN [id number]   Location [facility] 2W/SW Attending Lori Nicole MD   Hosp Day # 5 PCP Yvette Morrell MD, MD     Subjective:   Nedra Kennedy 05/05/2017    05/05/2017   CREATSERUM 6.41* 05/05/2017   BUN 49* 05/05/2017   * 05/05/2017   K 3.6 05/05/2017   CL 95 05/05/2017   CO2 22 05/05/2017   GLU 65* 05/05/2017   CA 7.7* 05/05/2017   ALB 2.5* 05/05/2017   ALKPHO 58 05/05/2017   BILT

## 2017-05-05 NOTE — PROGRESS NOTES
Patient was ordered to be transferred to telemetry floor. Called and gave report to [employer]. Awaiting for transport.

## 2017-05-05 NOTE — PHYSICAL THERAPY NOTE
PHYSICAL THERAPY TREATMENT NOTE - INPATIENT    Room Number: 547/219-X       Presenting Problem: pleural effusion    Problem List  Principal Problem:    Pleural effusion  Active Problems:    Sepsis due to pneumonia [facility])    Hypoxia      ASSESSMENT   Pt in need...   -   Moving to and from a bed to a chair (including a wheelchair)?: A Little   -   Need to walk in hospital room?: A Little   -   Climbing 3-5 steps with a railing?: A Lot    AM-PAC Score:  Raw Score: 17   PT Approx Degree of Impairment Score: 50.

## 2017-05-05 NOTE — PLAN OF CARE
Problem: Patient Centered Care  Goal: Patient preferences are identified and integrated in the patient’s plan of care  Interventions:  - What would you like us to know as we care for you? He refuses female RN to go with him in the bathroom.   - Provide time needed discharge resources and transportation as appropriate  - Identify discharge learning needs (meds, wound care, etc)  - Arrange for interpreters to assist at discharge as needed  - Consider post-discharge preferences of patient/family/discharge partne weight  - Monitor urine specific gravity, serum osmolarity and serum sodium as indicated or ordered  - Monitor response to interventions for patient’s volume status, including labs, urine output, blood pressure (other measures as available)  - Encourage or

## 2017-05-05 NOTE — PROGRESS NOTES
[facility]    Progress Note    Nawaf Walls Patient Status:  Inpatient    10/17/1945 MRN [id number]   Location [facility] 2W/SW Attending Jocelyne Quinonez MD   Hosp Day # 5 PCP Andrea Al MD, MD     Subjective:   Caryn Olea 05/05/2017   CL 95 05/05/2017   CO2 22 05/05/2017   GLU 65* 05/05/2017   CA 7.7* 05/05/2017   ALB 2.5* 05/05/2017   ALKPHO 58 05/05/2017   BILT 1.0 05/05/2017   TP 5.9 05/05/2017   AST 43* 05/05/2017   ALT 21 05/05/2017   PTT 32.2 05/01/2017   INR 1.5* 05/

## 2017-05-05 NOTE — PROGRESS NOTES
[facility]    Progress Note    Nawaf Walls Patient Status:  Inpatient    10/17/1945 MRN [id number]   Location [facility] 2W/SW Attending Jocelyne Quinonez MD   Hosp Day # 5 PCP Andrea Al MD, MD       Subjective:   Nestor Soto 12*  9*   CA  8.2*  8.1*  7.7*   NA  137  135*  131*   K  3.6  3.4  3.6   CL  102  99  95   CO2  22  24  22                    Tirso Browning MD  5/5/2017

## 2017-05-05 NOTE — SLP NOTE
SPEECH DAILY NOTE - INPATIENT        ASSESSMENT & PLAN   ASSESSMENT   Pt seen upright in bed with trials of thin liquids and pills taken with thin liquids. Pt declined any food trials. Reviewed all swallowing precautions and strategies with Pt and son.  RN

## 2017-05-05 NOTE — PROGRESS NOTES
[facility]    Progress Note    Dawna Patel Patient Status:  Inpatient    10/17/1945 MRN [id number]   Location [facility] 2W/SW Attending Asha Hodges MD   Hosp Day # 4 PCP Neal Palafox MD, MD     Subjective:   Mariusz Ortiz 05/04/2017   * 05/04/2017   K 3.4 05/04/2017   CL 99 05/04/2017   CO2 24 05/04/2017   * 05/04/2017   CA 8.1* 05/04/2017   ALB 2.6* 05/04/2017   ALKPHO 45 05/03/2017   BILT 1.0 05/03/2017   TP 6.0 05/03/2017   AST 39 05/03/2017   ALT 10* 05/03/

## 2017-05-05 NOTE — PROGRESS NOTES
Patient seen in follow up. Patient denies any chest pain or sob.     Doing better  discussed with nurse  Chart reviewed     05/05/17  1500   BP: 161/64   Pulse: 101   Temp:    Resp: 34       Intake/Output Summary (Last 24 hours) at 05/05/17 5838  Last cetirizine (ZYRTEC) tab 5 mg 5 mg Oral Daily   ChlorproMAZINE HCl (THORAZINE) tab 25 mg 25 mg Oral QID   Pantoprazole Sodium (PROTONIX) EC tab 40 mg 40 mg Oral QAM AC   HYDROcodone-acetaminophen (NORCO) 5-325 MG per tab 2 tablet 2 tablet Oral Q4H PRN   iso HYDROcodone-acetaminophen 5-325 MG Oral Tab Take 2 tablets by mouth every 4 (four) hours as needed for Pain. aspirin 81 MG Oral Tab EC Take 1 tablet (81 mg total) by mouth daily.    Clopidogrel Bisulfate (PLAVIX) 75 MG Oral Tab Take 1 tablet (75 mg total) - Was started on Eliquis 2.5mg BID for anticoagulation at rehab facility. Stopped. No recurrence so far. Hold off on anticoagulation considering there is a pericardial effusion. 4. ESRD on HD     5. Anemia     6. Healthcare associated pneumonia      7.

## 2017-05-05 NOTE — DISCHARGE PLANNING
LINDSEY following up on d/c planning for the patient. [facility] [place] [facility] approval for the transfer and they are arranging the dialysis as well. Updates, nephrology information and flow sheets sent today as well.     America Murphy LCSW  [id number]

## 2017-05-06 ENCOUNTER — APPOINTMENT (OUTPATIENT)
Dept: CT IMAGING | Facility: HOSPITAL | Age: 72
DRG: 871 | End: 2017-05-06
Attending: INTERNAL MEDICINE
Payer: MEDICAID

## 2017-05-06 ENCOUNTER — APPOINTMENT (OUTPATIENT)
Dept: GENERAL RADIOLOGY | Facility: HOSPITAL | Age: 72
DRG: 871 | End: 2017-05-06
Attending: INTERNAL MEDICINE
Payer: MEDICAID

## 2017-05-06 PROCEDURE — 71250 CT THORAX DX C-: CPT | Performed by: INTERNAL MEDICINE

## 2017-05-06 PROCEDURE — 71010 XR CHEST AP PORTABLE  (CPT=71010): CPT | Performed by: INTERNAL MEDICINE

## 2017-05-06 PROCEDURE — 99232 SBSQ HOSP IP/OBS MODERATE 35: CPT | Performed by: INTERNAL MEDICINE

## 2017-05-06 RX ORDER — GUAIFENESIN 600 MG
600 TABLET, EXTENDED RELEASE 12 HR ORAL 2 TIMES DAILY
Status: DISCONTINUED | OUTPATIENT
Start: 2017-05-06 | End: 2017-05-14

## 2017-05-06 RX ORDER — 0.9 % SODIUM CHLORIDE 0.9 %
VIAL (ML) INJECTION
Status: COMPLETED
Start: 2017-05-06 | End: 2017-05-06

## 2017-05-06 RX ORDER — MEGESTROL ACETATE 40 MG/ML
400 SUSPENSION ORAL DAILY
Status: DISCONTINUED | OUTPATIENT
Start: 2017-05-06 | End: 2017-05-14

## 2017-05-06 NOTE — PROGRESS NOTES
[facility]    Progress Note    Jess Lei Patient Status:  Inpatient    10/17/1945 MRN [id number]   Location [facility] 3W/SW Attending Js Wen MD   Hosp Day # 6 PCP Genevieve Alvarez MD, MD     Subjective:   Jackie Garnett  05/06/2017   CREATSERUM 4.70* 05/06/2017   BUN 31* 05/06/2017   * 05/06/2017   K 3.6 05/06/2017    05/06/2017   CO2 24 05/06/2017   * 05/06/2017   CA 8.0* 05/06/2017   ALB 2.3* 05/06/2017   ALKPHO 58 05/05/2017   BILT 1.0 05/05

## 2017-05-06 NOTE — PROGRESS NOTES
[facility]    [facility]      Assessment and Plan:   1. Dyspnea status post thoracic surgery–the patient has left lower lobe atelectasis with a modest partially loculated left pleural effusion with possible underlying pneumonitis.     Re

## 2017-05-06 NOTE — PROGRESS NOTES
Patient family brought up dinner for patient, he was already eating when PCT attempted to get BG tonight.

## 2017-05-06 NOTE — PLAN OF CARE
Called Dr. Woodrow Alvarez with portable CXR result, order to notify Dr. Juani Spencer of result    [phone] with Dr. Juani Spencer, read CXR result, will follow up in AM    Will continue to monitor     [phone] CT Chest result available, Dr. Niya Gibbs on call for Dr. Juani Spencer, read the r

## 2017-05-06 NOTE — PROGRESS NOTES
Patient seen in follow up. Vitals reviewed  systolic. % O2 sat. Pulse 70-80  Labs K 3.6, creatinine 4.7, WBC 13.1, Hgb 8.7, Plt 221  Feeling better. No chest pain or shortness of breath.  Had nose bleed, bleeding from chest tube site   B AmLODIPine Besylate (NORVASC) tab 10 mg 10 mg Oral Daily   aspirin EC tab 81 mg 81 mg Oral Daily   atorvastatin (LIPITOR) tab 40 mg 40 mg Oral Nightly   bisacodyl (DULCOLAX) EC tab 5 mg 5 mg Oral Daily PRN   Calcium Carbonate-Vitamin D (OSCAL-500) 500-200 AmLODIPine Besylate 10 MG Oral Tab Take 1 tablet (10 mg total) by mouth daily. bisacodyl 5 MG Oral Tab EC Take 1 tablet (5 mg total) by mouth daily as needed for constipation.    HYDROcodone-acetaminophen 5-325 MG Oral Tab Take 2 tablets by mouth every 4 - Was started on Eliquis 2.5mg BID for anticoagulation at rehab facility. Stopped. No recurrence so far. Hold off on anticoagulation considering there is a pericardial effusion. 4. ESRD on HD     5. Anemia     6. Healthcare associated pneumonia      7.

## 2017-05-07 PROCEDURE — 99233 SBSQ HOSP IP/OBS HIGH 50: CPT | Performed by: INTERNAL MEDICINE

## 2017-05-07 RX ORDER — 0.9 % SODIUM CHLORIDE 0.9 %
VIAL (ML) INJECTION
Status: COMPLETED
Start: 2017-05-07 | End: 2017-05-07

## 2017-05-07 RX ORDER — ALBUMIN (HUMAN) 12.5 G/50ML
100 SOLUTION INTRAVENOUS AS NEEDED
Status: DISCONTINUED | OUTPATIENT
Start: 2017-05-08 | End: 2017-05-12 | Stop reason: ALTCHOICE

## 2017-05-07 NOTE — CHILD LIFE NOTE
Patient seen in follow up. No overnight events.    Vitals reviewed BP stable, sating 93-94%   Labs reviewed K 4.3, creatinine 6.91, WBC 13.4, Hgb 8.6, Plat 273   No chest pain or shortness of breath     CT chest 05/06[de-identified]  CONCLUSION:   1. Moderate pred metoprolol Tartrate (LOPRESSOR) tab 25 mg 25 mg Oral 2x Daily(Beta Blocker)   Heparin Sodium (Porcine) 5000 UNIT/ML injection 5,000 Units 5,000 Units Subcutaneous Q8H [address]   Albumin Human (ALBUMINAR) 25 % solution 100 mL 100 mL Intravenous PRN   Albumin Hum vasopressin (PITRESSIN) 20 Units in sodium chloride 0.9 % 50 mL infusion for shock 0.04 Units/min Intravenous Continuous PRN   Albumin Human (ALBUMINAR) 25 % solution 100 mL 100 mL Intravenous PRN   epoetin rosendo (EPOGEN,PROCRIT) 47451 UNIT/ML injection 3,0 5/6/2017  CONCLUSION:  1. Moderate predominantly left sided pericardial effusion without significant change. 2. Moderate to large loculated left pleural effusion with interval increase. 3. Near complete atelectasis of the left lower lobe.  4. Worsening part 1. On asa, Statin, BB, nitrates for CAD    2. On metoprolol, amlodipine for HTN    3. HD   4. Thoracentesis planned for worsening left sided pleural effusion   5. Physical therapy        Natividad Resendiz DO   Phone: [phone]  [email or web address]

## 2017-05-07 NOTE — PROGRESS NOTES
Pulmonary/Critical Care Follow Up Note    HPI:   Mary Macias is a 66year old male with Patient presents with:  Jose Ma MD, MD  Admission Attending Jim Aponte 15 Day #7    No fever  Occ cough and n EC tab 5 mg, 5 mg, Oral, Daily PRN  •  Calcium Carbonate-Vitamin D (OSCAL-500) 500-200 MG-UNIT per tab, , Oral, Daily  •  cetirizine (ZYRTEC) tab 5 mg, 5 mg, Oral, Daily  •  ChlorproMAZINE HCl (THORAZINE) tab 25 mg, 25 mg, Oral, QID  •  Pantoprazole Sodium WBC 13.4 05/07/2017   HGB 8.6 05/07/2017   HCT 26.6 05/07/2017    05/07/2017   CREATSERUM 6.91 05/07/2017   BUN 50 05/07/2017    05/07/2017   K 4.3 05/07/2017   CL 94 05/07/2017   CO2 20 05/07/2017    05/07/2017   CA 8.2 05/07/2017

## 2017-05-07 NOTE — PROGRESS NOTES
[facility]    Progress Note    Bobbie Leventhal Patient Status:  Inpatient    10/17/1945 MRN [id number]   Location [facility] 3W/SW Attending Deirdre Ortiz MD   Hosp Day # 7 PCP Ilana Estrada MD, MD     Subjective:   Sandhya Bravo Results  Component Value Date   WBC 13.1* 05/06/2017   HGB 8.7* 05/06/2017   HCT 26.7* 05/06/2017    05/06/2017   CREATSERUM 4.70* 05/06/2017   BUN 31* 05/06/2017   * 05/06/2017   K 3.6 05/06/2017    05/06/2017   CO2 24 05/06/2017   GLU

## 2017-05-08 PROCEDURE — 99233 SBSQ HOSP IP/OBS HIGH 50: CPT | Performed by: INTERNAL MEDICINE

## 2017-05-08 RX ORDER — 0.9 % SODIUM CHLORIDE 0.9 %
VIAL (ML) INJECTION
Status: COMPLETED
Start: 2017-05-08 | End: 2017-05-08

## 2017-05-08 RX ORDER — SODIUM CHLORIDE 9 MG/ML
INJECTION, SOLUTION INTRAVENOUS
Status: DISPENSED
Start: 2017-05-08 | End: 2017-05-08

## 2017-05-08 NOTE — PROGRESS NOTES
[facility]    Progress Note    Aram Rudolph Patient Status:  Inpatient    10/17/1945 MRN [id number]   Location [facility] 3W/SW Attending Kale Hamilton MD   Hosp Day # 8 PCP Jana Condon MD, MD       Subjective:   Loc Smith predominantly left sided pericardial effusion without significant change. 2. Moderate to large loculated left pleural effusion with interval increase. 3. Near complete atelectasis of the left lower lobe. 4. Worsening partial atelectasis of left upper lobe.

## 2017-05-08 NOTE — PHYSICAL THERAPY NOTE
PHYSICAL THERAPY TREATMENT NOTE - INPATIENT    Room Number: 945/767-B       Presenting Problem: pleural effusion    Problem List  Principal Problem:    Pleural effusion  Active Problems:    Sepsis due to pneumonia [facility])    Hypoxia      ASSESSMENT   Pt in from a bed to a chair (including a wheelchair)?: A Little   -   Need to walk in hospital room?: A Little   -   Climbing 3-5 steps with a railing?: A Lot    AM-PAC Score:  Raw Score: 17   PT Approx Degree of Impairment Score: 50.57%   Standardized Score (AM

## 2017-05-08 NOTE — PROGRESS NOTES
[facility]    Progress Note    Arleta Slim Patient Status:  Inpatient    10/17/1945 MRN [id number]   Location [facility] 3W/SW Attending Louisa Bill MD   Hosp Day # 8 PCP Nnamdi Vazquez MD, MD     Subjective:     Cons 05/08/2017    05/08/2017   CREATSERUM 8.09* 05/08/2017   BUN 58* 05/08/2017   * 05/08/2017   K 3.8 05/08/2017   CL 97 05/08/2017   CO2 21* 05/08/2017   * 05/08/2017   CA 8.0* 05/08/2017   ALB 2.4* 05/08/2017   ALKPHO 62 05/08/2017   MAN[place]

## 2017-05-08 NOTE — PAYOR COMM NOTE
CLINICAL UPDATE  Progress Notes by Sophie Huerta DO at 5/8/2017  1:10 PM      Author: Sophie Huerta DO Service: (none) Author Type: Physician     Filed: 5/8/2017  1:14 PM Note Time: 5/8/2017  1:10 PM Status: Signed     : Sophie Huerta DO Heparin Sodium (Porcine) 5000 UNIT/ML injection 5,000 Units  5,000 Units  Subcutaneous  Q8H [facility]    Albumin Human (ALBUMINAR) 25 % solution 100 mL  100 mL  Intravenous  PRN    Albumin Human (ALBUMINAR) 25 % solution 100 mL  100 mL  Intravenous  PRN    Zolpi vasopressin (PITRESSIN) 20 Units in sodium chloride 0.9 % 50 mL infusion for shock  0.04 Units/min  Intravenous  Continuous PRN    Albumin Human (ALBUMINAR) 25 % solution 100 mL  100 mL  Intravenous  PRN    epoetin rosendo (EPOGEN,PROCRIT) 25526 UNIT/ML injec 5/6/2017  CONCLUSION:           1. Moderate predominantly left sided pericardial effusion without significant change. 2. Moderate to large loculated left pleural effusion with interval increase. 3. Near complete atelectasis of the left lower lobe.  4. Worse - Was started on Eliquis 2.5mg BID for anticoagulation at rehab facility. Stopped. No recurrence so far. Hold off on anticoagulation considering there is a pericardial effusion. 4. ESRD on HD     5. Anemia     6. Healthcare associated pneumonia      7. •  metoprolol Tartrate (LOPRESSOR) tab 25 mg, 25 mg, Oral, 2x Daily(Beta Blocker)  •  Heparin Sodium (Porcine) 5000 UNIT/ML injection 5,000 Units, 5,000 Units, Subcutaneous, Q8H ANAT  •  Albumin Human (ALBUMINAR) 25 % solution 100 mL, 100 mL, Intravenous, P •  norepinephrine (LEVOPHED) 4 mg/250 ml premix infusion, 0.5-30 mcg/min, Intravenous, Continuous PRN  •  vasopressin (PITRESSIN) 20 Units in sodium chloride 0.9 % 50 mL infusion for shock, 0.04 Units/min, Intravenous, Continuous PRN  •  Albumin Human (ALB Plan supportive care      Ed-stage renal disease  started recently on HD / renal following  Plan RRT per renal    DVT prophylaxis / heparin sq         Danette Mcgarry MD

## 2017-05-08 NOTE — SLP NOTE
Attempted to see patient for follow-up diet tolerance and education on dysphagia. However, pt remains in dialysis at time of AM attempt. Will follow in PM as schedule allows.      Thank you,   Malinda Hurtado [place], [id number] [facility]  Speech-Language Pathologist  [place]

## 2017-05-08 NOTE — PROGRESS NOTES
[facility]    CDI Prediction Tool Protocol    OVP (oral vancomycin prophylaxis) 125 mg PO BID is being started in this patient based on a score of 14.   This patient is currently at high risk for developing CDI due to his/her score being >/=13 po

## 2017-05-09 ENCOUNTER — APPOINTMENT (OUTPATIENT)
Dept: INTERVENTIONAL RADIOLOGY/VASCULAR | Facility: HOSPITAL | Age: 72
DRG: 871 | End: 2017-05-09
Attending: INTERNAL MEDICINE
Payer: MEDICAID

## 2017-05-09 PROCEDURE — 99233 SBSQ HOSP IP/OBS HIGH 50: CPT | Performed by: INTERNAL MEDICINE

## 2017-05-09 PROCEDURE — 0W9B30Z DRAINAGE OF LEFT PLEURAL CAVITY WITH DRAINAGE DEVICE, PERCUTANEOUS APPROACH: ICD-10-PCS | Performed by: RADIOLOGY

## 2017-05-09 RX ORDER — SODIUM CHLORIDE 9 MG/ML
INJECTION, SOLUTION INTRAVENOUS
Status: COMPLETED
Start: 2017-05-09 | End: 2017-05-09

## 2017-05-09 RX ORDER — 0.9 % SODIUM CHLORIDE 0.9 %
VIAL (ML) INJECTION
Status: COMPLETED
Start: 2017-05-09 | End: 2017-05-09

## 2017-05-09 NOTE — PROGRESS NOTES
[place] [facility] - [facility]    Progress Note    Lawanda Hernandez Patient Status:  Inpatient    10/17/1945 MRN [id number]   Location [facility] 3W/SW Attending Jamil Carrizales MD   Hosp Day # 9 PCP Andres Maldonado MD, MD     Subjective:     Cons 05/09/2017   K 3.5 05/09/2017    05/09/2017   CO2 23 05/09/2017   * 05/09/2017   CA 8.0* 05/09/2017   ALB 2.3* 05/09/2017   ALKPHO 62 05/08/2017   BILT 1.1 05/08/2017   TP 5.9 05/08/2017   AST 29 05/08/2017   ALT 35 05/08/2017   PTT 32.2 05/01

## 2017-05-09 NOTE — PLAN OF CARE
CARDIOVASCULAR - ADULT    • Maintains optimal cardiac output and hemodynamic stability Progressing    • Absence of cardiac arrhythmias or at baseline Progressing          DISCHARGE PLANNING    • Discharge to home or other facility with appropriate resource

## 2017-05-09 NOTE — PHYSICAL THERAPY NOTE
PHYSICAL THERAPY TREATMENT NOTE - INPATIENT    Room Number: 637/267-Z       Presenting Problem: pleural effusion    Problem List  Principal Problem:    Pleural effusion  Active Problems:    Sepsis due to pneumonia [facility])    Hypoxia      ASSESSMENT   Pt in wheelchair)?: A Little   -   Need to walk in hospital room?: A Little   -   Climbing 3-5 steps with a railing?: A Lot    AM-PAC Score:  Raw Score: 17   PT Approx Degree of Impairment Score: 50.57%   Standardized Score (AM-PAC Scale): 42.13   CMS Modifier (

## 2017-05-09 NOTE — PROGRESS NOTES
[facility]    Progress Note    Mara Roque Patient Status:  Inpatient    10/17/1945 MRN [id number]   Location [facility] 3W/SW Attending Pj Lowe MD   Hosp Day # 8 PCP Perlita Love MD, MD     Subjective:   Tyrese Callahan Results  Component Value Date   WBC 13.6* 05/08/2017   HGB 8.9* 05/08/2017   HCT 27.2* 05/08/2017    05/08/2017   CREATSERUM 8.09* 05/08/2017   BUN 58* 05/08/2017   * 05/08/2017   K 3.8 05/08/2017   CL 97 05/08/2017   CO2 21* 05/08/2017   GLU

## 2017-05-09 NOTE — PROGRESS NOTES
Patient seen in follow up  No events overnight. No chest pain or shortness of breath. Sitting in chair. Ambulating more. Scheduled for thoracentesis today   Vitals reviewed. BP stable. Heart rate stable.  Sating 93%   Labs reviewed Creatinine 5, K 3.5 dextrose injection 50 mL 50 mL Intravenous PRN   Glucose-Vitamin C (DEX-4) 4-0.006 g chewable tab 4 tablet 4 tablet Oral Q15 Min PRN   insulin aspart (NOVOLOG) 100 UNIT/ML flexpen 1-5 Units 1-5 Units Subcutaneous TID CC   AmLODIPine Besylate (NORVASC) tab atorvastatin 40 MG Oral Tab Take 1 tablet (40 mg total) by mouth nightly. metoprolol Tartrate 25 MG Oral Tab Take 0.5 tablets (12.5 mg total) by mouth 2x Daily(Beta Blocker).    AmLODIPine Besylate 10 MG Oral Tab Take 1 tablet (10 mg total) by mouth daily 4. Post op atrial fibrillation    - Currently sinus    - Was started on Eliquis 2.5mg BID for anticoagulation at rehab facility. Stopped. No recurrence so far. Hold off on anticoagulation considering there is a pericardial effusion. 4. ESRD on HD     5.

## 2017-05-10 ENCOUNTER — APPOINTMENT (OUTPATIENT)
Dept: INTERVENTIONAL RADIOLOGY/VASCULAR | Facility: HOSPITAL | Age: 72
DRG: 871 | End: 2017-05-10
Attending: INTERNAL MEDICINE
Payer: MEDICAID

## 2017-05-10 PROCEDURE — 99233 SBSQ HOSP IP/OBS HIGH 50: CPT | Performed by: INTERNAL MEDICINE

## 2017-05-10 RX ORDER — SODIUM CHLORIDE 9 MG/ML
INJECTION, SOLUTION INTRAVENOUS
Status: COMPLETED
Start: 2017-05-10 | End: 2017-05-10

## 2017-05-10 RX ORDER — ALBUMIN (HUMAN) 12.5 G/50ML
100 SOLUTION INTRAVENOUS AS NEEDED
Status: DISCONTINUED | OUTPATIENT
Start: 2017-05-12 | End: 2017-05-20

## 2017-05-10 RX ORDER — LIDOCAINE HYDROCHLORIDE 20 MG/ML
INJECTION, SOLUTION EPIDURAL; INFILTRATION; INTRACAUDAL; PERINEURAL
Status: COMPLETED
Start: 2017-05-10 | End: 2017-05-10

## 2017-05-10 RX ORDER — MIDAZOLAM HYDROCHLORIDE 1 MG/ML
1 INJECTION INTRAMUSCULAR; INTRAVENOUS EVERY 5 MIN PRN
Status: DISCONTINUED | OUTPATIENT
Start: 2017-05-10 | End: 2017-05-10 | Stop reason: HOSPADM

## 2017-05-10 RX ORDER — MIDAZOLAM HYDROCHLORIDE 1 MG/ML
INJECTION INTRAMUSCULAR; INTRAVENOUS
Status: COMPLETED
Start: 2017-05-10 | End: 2017-05-10

## 2017-05-10 RX ORDER — 0.9 % SODIUM CHLORIDE 0.9 %
VIAL (ML) INJECTION
Status: COMPLETED
Start: 2017-05-10 | End: 2017-05-10

## 2017-05-10 NOTE — PAYOR COMM NOTE
Progress Notes by Shantell Almeida MD at 5/10/2017  9:44 AM      Author: Shantell Almeida MD Service: (none) Author Type: Physician     Filed: 5/10/2017  9:47 AM Note Time: 5/10/2017  9:44 AM Status: Signed     : Shantell Almeida MD (Physician)       started recently on HD / renal following     7- dvt prophylaxis / heparin sq             Results:      Lab Results  Component  Value  Date    [facility] SHEA 12.7*  05/09/2017    HGB  9.0*  05/09/2017    HCT  27.5*  05/09/2017    PLT  354  05/09/2017    CREATSERUM  5 respiratory effort  CV: Heart with regular rate and rhythm, no edema  Abd: Abdomen soft, nontender, nondistended, no organomegaly, bowel sounds present        Assessment and Plan:    1 - ESRD on MHD     HD today.  Plan next HD Friday    2 - Anemia in ESRD

## 2017-05-10 NOTE — PROGRESS NOTES
[facility]    Progress Note    Abbey Kaye Patient Status:  Inpatient    10/17/1945 MRN [id number]   Location [facility] 3W/SW Attending Gamaliel Rg MD   Hosp Day # 10 PCP Leeanne Barbour MD, MD     Subjective:   Monik Zapata Results  Component Value Date   WBC 12.7* 05/09/2017   HGB 9.0* 05/09/2017   HCT 27.5* 05/09/2017    05/09/2017   CREATSERUM 5.06* 05/09/2017   BUN 30* 05/09/2017    05/09/2017   K 3.5 05/09/2017    05/09/2017   CO2 23 05/09/2017   GLU 1

## 2017-05-10 NOTE — PROGRESS NOTES
Post procedure note:  Chest tube inserted by Dr. Beni Caceres. Placed to JOHANNA suction. Specimen sent to lab. Transferred to cath lab holding.    Report called to [facility] Done RJIHAN

## 2017-05-10 NOTE — PROCEDURES
[facility] HOSP - [facility]  Procedure Note    Mary Macias Patient Status:  Inpatient    10/17/1945 MRN [id number]   Location [facility] Attending Phillis Closs, MD   Hosp Day # 10 PCP Jose Luis Ma MD, MD     Proced

## 2017-05-10 NOTE — PROGRESS NOTES
[facility]    Progress Note    Elena Chavez Patient Status:  Inpatient    10/17/1945 MRN [id number]   Location [facility] 3W/SW Attending Alethea Frausto MD   Hosp Day # 9 PCP Clifford Kwan MD, MD     Subjective:   Jessica Garcia 05/09/2017   HGB 9.0* 05/09/2017   HCT 27.5* 05/09/2017    05/09/2017   CREATSERUM 5.06* 05/09/2017   BUN 30* 05/09/2017    05/09/2017   K 3.5 05/09/2017    05/09/2017   CO2 23 05/09/2017   * 05/09/2017   CA 8.0* 05/09/2017   ALB

## 2017-05-10 NOTE — DIETARY NOTE
ADULT NUTRITION RE-ASSESSMENT    Pt is at moderate nutrition risk. Pt does not meet malnutrition criteria.       RECOMMENDATIONS TO MD: RD managing oral nutriitional supplementation(ONS)     NUTRITION DIAGNOSIS/PROBLEM:  Unintentional weight loss related t up  - Nutrition education: Encouraged continued good intake of protein foods.   - Coordination of nutrition care: collaboration with other providers  - Discharge and transfer of nutrition care to new setting or provider: Pt from rehab pta.      ADMITTING DI Oral BID   • Megestrol Acetate  400 mg Oral Daily   • insulin aspart  2 Units Subcutaneous TID CC   • insulin detemir  5 Units Subcutaneous Nightly   • metoprolol Tartrate  25 mg Oral 2x Daily(Beta Blocker)   • Heparin Sodium (Porcine)  5,000 Units Subcuta intake, tolerance of PO intake and adequacy of supplement intake.   - Anthropometric Measurement:      Monitor: wt and wt change   - Nutrition Goals:      allow wt loss due to fluid losses, regain wt as able, PO and supplement greater than 75% of needs, goo

## 2017-05-10 NOTE — PROGRESS NOTES
[facility]    Progress Note    Aram Rudolph Patient Status:  Inpatient    10/17/1945 MRN [id number]   Location [facility] 3W/SW Attending Kale Hamilton MD   Hosp Day # 10 PCP Jana Condon MD, MD     Subjective:   Robert Hull 3.5 05/09/2017    05/09/2017   CO2 23 05/09/2017   * 05/09/2017   CA 8.0* 05/09/2017   ALB 2.3* 05/09/2017   ALKPHO 62 05/08/2017   BILT 1.1 05/08/2017   TP 5.9 05/08/2017   AST 29 05/08/2017   ALT 35 05/08/2017   PTT 32.2 05/01/2017   INR 1.5

## 2017-05-10 NOTE — PLAN OF CARE
Problem: Diabetes/Glucose Control  Goal: Glucose maintained within prescribed range  INTERVENTIONS:  - Monitor Blood Glucose as ordered  - Assess for signs and symptoms of hyperglycemia and hypoglycemia  - Administer ordered medications to maintain glucose Progressing    Problem: DISCHARGE PLANNING  Goal: Discharge to home or other facility with appropriate resources  INTERVENTIONS:  - Identify barriers to discharge w/pt and caregiver  - Include patient/family/discharge partner in discharge Billy Bergman Achieves optimal ventilation and oxygenation  INTERVENTIONS:  - Assess for changes in respiratory status  - Assess for changes in mentation and behavior  - Position to facilitate oxygenation and minimize respiratory effort  - Oxygen supplementation based o volume status, including labs, urine output, blood pressure (other measures as available)  - Encourage oral intake as appropriate  - Instruct patient on fluid and nutrition restrictions as appropriate   Outcome: Progressing    Comments:   HD today 2lts/out

## 2017-05-11 PROCEDURE — 99233 SBSQ HOSP IP/OBS HIGH 50: CPT | Performed by: INTERNAL MEDICINE

## 2017-05-11 RX ORDER — 0.9 % SODIUM CHLORIDE 0.9 %
VIAL (ML) INJECTION
Status: COMPLETED
Start: 2017-05-11 | End: 2017-05-11

## 2017-05-11 RX ORDER — 0.9 % SODIUM CHLORIDE 0.9 %
VIAL (ML) INJECTION
Status: DISPENSED
Start: 2017-05-11 | End: 2017-05-11

## 2017-05-11 NOTE — PAYOR COMM NOTE
Progress Notes by Alice Butterfield MD at 5/11/2017 12:38 PM      Author: Alice Butterfield MD Service: (none) Author Type: Physician     Filed: 5/11/2017 12:40 PM Note Time: 5/11/2017 12:38 PM Status: Signed     : Alice Butterfield MD (Physician)       Pu solution 100 mL, 100 mL, Intravenous, PRN  •  Zolpidem Tartrate (AMBIEN) tab 5 mg, 5 mg, Oral, Nightly PRN  •  Piperacillin Sod-Tazobactam So (ZOSYN) 3.375 g in dextrose 5 % 100 mL IVPB, 3.375 g, Intravenous, Q12H  •  dextrose injection 50 mL, 50 mL, Intra Subcutaneous, Once per day on Mon Wed Fri      Allergies:  No Known Allergies          PHYSICAL EXAM:    Blood pressure 123/64, pulse 93, temperature 99.4 °F (37.4 °C), temperature source Oral, resp.  rate 18, height 5' 7\" (1.702 m), weight 139 lb 11.2 oz Nurse Practitioner     Filed: 5/11/2017  2:26 PM Note Time: 5/11/2017  2:23 PM Status: Signed     : Ina VELASQUEZ APN (Nurse Practitioner)     [facility]  Progress Note  Ese Funes 8912 Patient Status:  Gareth Deluca tpa 4mg/10cc infused into chest tube drain for 2 hour dwell time.   2cc's bloody output. PLAN;  If no further output a second, more anterior drain can be placed tomorrow.            DARON Guaman  5/11/2017

## 2017-05-11 NOTE — PROGRESS NOTES
Pulmonary/Critical Care Follow Up Note    HPI:   Jerome Clemens is a 66year old male with Patient presents with:  Nitin Allen      PCP Viv Hernandez MD, MD  Admission Attending Jim Goncalves 15 Day #11    No fever  Occ cough and Glucose-Vitamin C (DEX-4) 4-0.006 g chewable tab 4 tablet, 4 tablet, Oral, Q15 Min PRN  •  AmLODIPine Besylate (NORVASC) tab 10 mg, 10 mg, Oral, Daily  •  aspirin EC tab 81 mg, 81 mg, Oral, Daily  •  atorvastatin (LIPITOR) tab 40 mg, 40 mg, Oral, Nightly %.    Intake/Output Summary (Last 24 hours) at 05/11/17 1238  Last data filed at 05/11/17 1150   Gross per 24 hour   Intake    700 ml   Output     15 ml   Net    685 ml     NAD  Lung occ crackle bases  CV Reg  Abd sott + bs  Ext trace edema      LABS:

## 2017-05-11 NOTE — PROGRESS NOTES
[facility]  Progress Note    Branden Rousseau Patient Status:  Inpatient    10/17/1945 MRN [id number]   Location [facility] 3W/SW Attending Kwesi Blount MD   Hosp Day # 6 PCP Lainey Ponce MD, MD       Subjective:   Yaoies

## 2017-05-11 NOTE — PROGRESS NOTES
Patient seen in follow up. No overnight events.    Feeling better, laying in bed   Planned for thoracentesis   Vitals reviewed  Labs reviewed      05/10/17  2202   BP: 104/55   Pulse: 84   Temp: 98.2 °F (36.8 °C)   Resp: 18       Intake/Output Summary Piperacillin Sod-Tazobactam So (ZOSYN) 3.375 g in dextrose 5 % 100 mL IVPB 3.375 g Intravenous Q12H   dextrose injection 50 mL 50 mL Intravenous PRN   Glucose-Vitamin C (DEX-4) 4-0.006 g chewable tab 4 tablet 4 tablet Oral Q15 Min PRN   AmLODIPine Besylate atorvastatin 40 MG Oral Tab Take 1 tablet (40 mg total) by mouth nightly. metoprolol Tartrate 25 MG Oral Tab Take 0.5 tablets (12.5 mg total) by mouth 2x Daily(Beta Blocker).    AmLODIPine Besylate 10 MG Oral Tab Take 1 tablet (10 mg total) by mouth daily 1. SOB- Improved   - Negative echo for tamponade however he does have small to moderate pericardial effusion    - Negative for PE    - On HD    - On abx for pneumonia     2.  POD CABG LIMA to LAD, SVG sequential to OM and distal circumflex artery, SVG to rP

## 2017-05-11 NOTE — PHYSICAL THERAPY NOTE
PHYSICAL THERAPY TREATMENT NOTE - INPATIENT    Room Number: 340/340-A     Session:       Presenting Problem: pleural effusion    Problem List  Principal Problem:    Pleural effusion  Active Problems:    Sepsis due to pneumonia ([facility] [address]    Hypoxia      Past M FT  Assistive Device: Rolling walker  Pattern: Shuffle  Stoop/Curb Assistance: Not tested  Comment : The pt displayed shuffled gait pattern and was lethargic throughout his session as he just finished dialysis    Skilled Therapy Provided: bed mobility supi be able to recite and maintain his sternal precautions prior to and during activity.    Goal #4   Current Status In progress   Goal #5    Goal #5   Current Status    Goal #6    Goal #6  Current Status

## 2017-05-12 ENCOUNTER — APPOINTMENT (OUTPATIENT)
Dept: GENERAL RADIOLOGY | Facility: HOSPITAL | Age: 72
DRG: 871 | End: 2017-05-12
Attending: INTERNAL MEDICINE
Payer: MEDICAID

## 2017-05-12 ENCOUNTER — APPOINTMENT (OUTPATIENT)
Dept: CT IMAGING | Facility: HOSPITAL | Age: 72
DRG: 871 | End: 2017-05-12
Attending: INTERNAL MEDICINE
Payer: MEDICAID

## 2017-05-12 PROCEDURE — 71250 CT THORAX DX C-: CPT | Performed by: INTERNAL MEDICINE

## 2017-05-12 PROCEDURE — 99233 SBSQ HOSP IP/OBS HIGH 50: CPT | Performed by: INTERNAL MEDICINE

## 2017-05-12 PROCEDURE — 71010 XR CHEST AP PORTABLE  (CPT=71010): CPT | Performed by: INTERNAL MEDICINE

## 2017-05-12 RX ORDER — 0.9 % SODIUM CHLORIDE 0.9 %
VIAL (ML) INJECTION
Status: COMPLETED
Start: 2017-05-12 | End: 2017-05-12

## 2017-05-12 NOTE — PROGRESS NOTES
Patient seen in follow up. No chest pain or shortness of breath  Vitals reviewed. BP stable   Labs reviewed.  K 4.5, creatinine 6.26, WBC 12.1, Hgb 8.2   Chest tube    05/12/17  0454   BP: 138/72   Pulse:    Temp: 99.7 °F (37.6 °C)   Resp: 22 atorvastatin (LIPITOR) tab 40 mg 40 mg Oral Nightly   bisacodyl (DULCOLAX) EC tab 5 mg 5 mg Oral Daily PRN   Calcium Carbonate-Vitamin D (OSCAL-500) 500-200 MG-UNIT per tab  Oral Daily   cetirizine (ZYRTEC) tab 5 mg 5 mg Oral Daily   ChlorproMAZINE HCl (TH Vitamin C 500 MG Oral Tab Take 1 tablet (500 mg total) by mouth 3 (three) times daily. Cetirizine HCl 5 MG Oral Tab Take by mouth daily. Calcium-Phosphorus-Vitamin D (CITRACAL +D3 OR) Take 1 tablet by mouth daily.    isosorbide mononitrate 20 MG Oral Ta 1. On asa, Statin, BB, nitrates for CAD    2. On metoprolol, amlodipine for HTN    3. HD    4. Chest tubes in place. Pulmonary managing     Talia Spring DO   Phone: [phone]  [email or web address]

## 2017-05-12 NOTE — PAYOR COMM NOTE
Progress Notes by Suzette Randolph MD at 5/12/2017 12:43 PM      Author: Suzette Randolph MD Service: (none) Author Type: Physician     Filed: 5/12/2017 12:44 PM Note Time: 5/12/2017 12:43 PM Status: Signed     : Suzette Randolph MD (Physician)       Ab troponin.   Hypoxia,improved  Probably septic.  pericardiac eff. .S/p cabg.  p a fib. ESRD. Minimal left nostril bleed,stoppped. S/p left pleural draining catheter. P dw nurse and 2 sons.  Culture no growth.                                   Intake     340 ml    Output    2650 ml    Net   -2310 ml      Wt Readings from Last 1 Encounters:  05/12/17 : 143 lb 3.2 oz (64.955 kg)      General: No acute distress. Neck: Jugular venous pulsations not seen. Lungs: Decreased breath sounds.  Chest tube ChlorproMAZINE HCl (THORAZINE) tab 25 mg  25 mg  Oral  QID    Pantoprazole Sodium (PROTONIX) EC tab 40 mg  40 mg  Oral  QAM AC    HYDROcodone-acetaminophen (NORCO) 5-325 MG per tab 2 tablet  2 tablet  Oral  Q4H PRN    isosorbide mononitrate (ISMO,MONOKET) Calcium-Phosphorus-Vitamin D (CITRACAL +D3 OR)  Take 1 tablet by mouth daily.    isosorbide mononitrate 20 MG Oral Tab  Take 20 mg by mouth daily.    Levothyroxine Sodium 25 MCG Oral Tab  Take 25 mcg by mouth before breakfast.    NEPHRO-LIZA 0.8 MG Oral Ta 2. On metoprolol, amlodipine for HTN    3. HD    4. Chest tubes in place. Pulmonary managing     Shar Mckoy DO    Phone: [phone]  [email or web address]

## 2017-05-12 NOTE — PROGRESS NOTES
[facility] - [facility]    Progress Note    Zachariah Peralta Patient Status:  Inpatient    10/17/1945 MRN [id number]   Location [facility] 3W/SW Attending John Marie MD   Hosp Day # 6 PCP Mohini Acosta MD, MD     Subjective:   Lina Coles Results  Component Value Date   WBC 11.5* 05/11/2017   HGB 8.4* 05/11/2017   HCT 25.9* 05/11/2017    05/11/2017   CREATSERUM 4.70* 05/11/2017   BUN 24* 05/11/2017    05/11/2017   K 4.3 05/11/2017   CL 98 05/11/2017   CO2 27 05/11/2017   GLU 13

## 2017-05-12 NOTE — PROGRESS NOTES
[facility]  Progress Note    Sinai Fulton Patient Status:  Inpatient    10/17/1945 MRN [id number]   Location [facility] 3W/SW Attending Umberto Johnson MD   Hosp Day # 15 PCP Nicole Cobian MD, MD       Subjective:   Yaoies

## 2017-05-12 NOTE — PHYSICAL THERAPY NOTE
Chart reviewed. Note that pt is in dialysis this AM. RN confirmed and reports that pt may need to also have another tube placed. RN advised this therapist to check back later in the afternoon, around 3 PM. PT will attempt this PM as schedule permits.

## 2017-05-12 NOTE — PHYSICAL THERAPY NOTE
PHYSICAL THERAPY TREATMENT NOTE - INPATIENT    Room Number: 340/340-A       Presenting Problem: pleural effusion    Problem List  Principal Problem:    Pleural effusion  Active Problems:    Sepsis due to pneumonia [facility])    Hypoxia      ASSESSMENT   RN rep talking. \"    OBJECTIVE  Precautions: Cardiac Drain in L scapular region    WEIGHT BEARING RESTRICTION  Weight Bearing Restriction: None                PAIN ASSESSMENT   Ratin          BALANCE Patient's self-stated goal is: to walk more    Goal #1  Patient is able to demonstrate supine - sit EOB @ level: supervision      Goal #1    Current Status  MOD A   Goal #2  Patient is able to demonstrate transfers Sit to/from Stand at assistance level: peoples

## 2017-05-12 NOTE — PROGRESS NOTES
[facility]    Progress Note    Ivana Mcdonald Patient Status:  Inpatient    10/17/1945 MRN [id number]   Location [facility] 3W/SW Attending Ran Flynn MD   Hosp Day # 15 PCP Osmar Cabral MD, MD     Subjective:   Dinh Kellogg Results  Component Value Date   WBC 12.1* 05/12/2017   HGB 8.2* 05/12/2017   HCT 25.2* 05/12/2017    05/12/2017   CREATSERUM 6.26* 05/12/2017   BUN 37* 05/12/2017   * 05/12/2017   K 4.5 05/12/2017   CL 97 05/12/2017   CO2 24 05/12/2017   GLU 1

## 2017-05-12 NOTE — PROGRESS NOTES
[facility]    Progress Note    Erica Espino Patient Status:  Inpatient    10/17/1945 MRN [id number]   Location [facility] 3W/SW Attending Martha Mckay MD   Hosp Day # 15 PCP Guillermo Vance MD, MD       Subjective:   Amos Rajan of the left lung, although areas of atelectasis/consolidation persists in the upper lobe and lung base. Residual loculated effusion may be present.                  Karoline Garcia MD  5/12/2017

## 2017-05-12 NOTE — PROGRESS NOTES
Patient seen in follow up.    Feeling better   Has chest tube in place   Vitals reviewed Blood pressure  systolic   Labs reviewed creatinine 4.7, K 4.3, WBC 11.5, Hgb 8.4      05/11/17  1647   BP: 99/54   Pulse: 89   Temp: 99.4 °F (37.4 °C)   Re Glucose-Vitamin C (DEX-4) 4-0.006 g chewable tab 4 tablet 4 tablet Oral Q15 Min PRN   AmLODIPine Besylate (NORVASC) tab 10 mg 10 mg Oral Daily   aspirin EC tab 81 mg 81 mg Oral Daily   atorvastatin (LIPITOR) tab 40 mg 40 mg Oral Nightly   bisacodyl (DULCOL metoprolol Tartrate 25 MG Oral Tab Take 0.5 tablets (12.5 mg total) by mouth 2x Daily(Beta Blocker). AmLODIPine Besylate 10 MG Oral Tab Take 1 tablet (10 mg total) by mouth daily.    bisacodyl 5 MG Oral Tab EC Take 1 tablet (5 mg total) by mouth daily as 3. Non ST elevation MI. S/p CABG    4. Post op atrial fibrillation    - Currently sinus    - Was started on Eliquis 2.5mg BID for anticoagulation at rehab facility. Stopped. No recurrence so far.  Hold off on anticoagulation considering there is a pericar

## 2017-05-13 PROCEDURE — 99233 SBSQ HOSP IP/OBS HIGH 50: CPT | Performed by: INTERNAL MEDICINE

## 2017-05-13 RX ORDER — SODIUM CHLORIDE 9 MG/ML
INJECTION, SOLUTION INTRAVENOUS
Status: COMPLETED
Start: 2017-05-13 | End: 2017-05-13

## 2017-05-13 RX ORDER — 0.9 % SODIUM CHLORIDE 0.9 %
VIAL (ML) INJECTION
Status: COMPLETED
Start: 2017-05-13 | End: 2017-05-13

## 2017-05-13 NOTE — PROGRESS NOTES
Pulmonary/Critical Care Follow Up Note    HPI:   Abbey Kaye is a 66year old male with Patient presents with:  Lanette Sunshine      PCP Leeanne Barbour MD, MD  Admission Attending Jim Hopson 15 Day #12    No fever  Occ cough   No mg, 25 mg, Oral, QID  •  Pantoprazole Sodium (PROTONIX) EC tab 40 mg, 40 mg, Oral, QAM AC  •  HYDROcodone-acetaminophen (NORCO) 5-325 MG per tab 2 tablet, 2 tablet, Oral, Q4H PRN  •  isosorbide mononitrate (ISMO,MONOKET) tab 20 mg, 20 mg, Oral, Daily  •  L worse on CT  Plan    cont iv abx , Day 10 today   Repeat CT chest   Will consider stopping abx tomorrow pending CT chest      Loculated left sided pleural effusion   Worse on CT    ? PNE vs post op  S/p IR small bore chest tube  - 150 after tPA  Plan CT ch

## 2017-05-13 NOTE — PROGRESS NOTES
Patient seen in follow up. Patient denies any chest pain has still some sob and cough.     05/13/17  1229   BP: 100/46   Pulse: 87   Temp:    Resp: 18       Intake/Output Summary (Last 24 hours) at 05/13/17 1550  Last data filed at 05/13/17 1118   Alena cetirizine (ZYRTEC) tab 5 mg 5 mg Oral Daily   ChlorproMAZINE HCl (THORAZINE) tab 25 mg 25 mg Oral QID   Pantoprazole Sodium (PROTONIX) EC tab 40 mg 40 mg Oral QAM AC   HYDROcodone-acetaminophen (NORCO) 5-325 MG per tab 2 tablet 2 tablet Oral Q4H PRN   iso Calcium-Phosphorus-Vitamin D (CITRACAL +D3 OR) Take 1 tablet by mouth daily. isosorbide mononitrate 20 MG Oral Tab Take 20 mg by mouth daily.    Levothyroxine Sodium 25 MCG Oral Tab Take 25 mcg by mouth before breakfast.   NEPHRO-LIZA 0.8 MG Oral Tab Take - Was started on Eliquis 2.5mg BID for anticoagulation at rehab facility. Stopped. No recurrence so far. Hold off on anticoagulation considering there is a pericardial effusion. 4. ESRD on HD     5. Anemia     6. Healthcare associated pneumonia      7.

## 2017-05-14 PROCEDURE — 99233 SBSQ HOSP IP/OBS HIGH 50: CPT | Performed by: INTERNAL MEDICINE

## 2017-05-14 RX ORDER — ALBUMIN (HUMAN) 12.5 G/50ML
100 SOLUTION INTRAVENOUS AS NEEDED
Status: DISCONTINUED | OUTPATIENT
Start: 2017-05-15 | End: 2017-05-14

## 2017-05-14 RX ORDER — BENZONATATE 100 MG/1
200 CAPSULE ORAL 3 TIMES DAILY PRN
Status: DISCONTINUED | OUTPATIENT
Start: 2017-05-14 | End: 2017-05-20

## 2017-05-14 NOTE — PLAN OF CARE
CARDIOVASCULAR - ADULT    • Maintains optimal cardiac output and hemodynamic stability Progressing    • Absence of cardiac arrhythmias or at baseline Progressing          GENITOURINARY - ADULT    • Absence of urinary retention Progressing          Diabetes

## 2017-05-14 NOTE — PROGRESS NOTES
Pt remains stable from respiratory and cardiac standpoint. Still has probable loculated effusion/hematoma upper posterior left pleural space. Would require at least VAT or possible thoracotomy to evacuate or possible decorticate. Discussed with Dr. Shruthi Fox.

## 2017-05-14 NOTE — PROGRESS NOTES
[facility]    Progress Note    Julien Abdalla Patient Status:  Inpatient    10/17/1945 MRN [id number]   Location [facility] 3W/SW Attending Caryn Steele MD   Hosp Day # 15 PCP Sandrita Alfred MD, MD     Subjective:   Dionne Maravilla antibiotics.                                               Results:       Lab Results  Component Value Date   WBC 12.1* 05/12/2017   HGB 8.2* 05/12/2017   HCT 25.2* 05/12/2017    05/12/2017   CREATSERUM 6.26* 05/12/2017   BUN 37* 05/12/2017   *

## 2017-05-14 NOTE — PROGRESS NOTES
Patient seen in follow up. Patient denies any chest pain has still some sob and cough.     05/14/17  1003   BP: 122/62   Pulse: 84   Temp: 98.6 °F (37 °C)   Resp: 18       Intake/Output Summary (Last 24 hours) at 05/14/17 1502  Last data filed at 05/1 Pantoprazole Sodium (PROTONIX) EC tab 40 mg 40 mg Oral QAM AC   HYDROcodone-acetaminophen (NORCO) 5-325 MG per tab 2 tablet 2 tablet Oral Q4H PRN   isosorbide mononitrate (ISMO,MONOKET) tab 20 mg 20 mg Oral Daily   Levothyroxine Sodium (SYNTHROID, LEVOTHRO Levothyroxine Sodium 25 MCG Oral Tab Take 25 mcg by mouth before breakfast.   NEPHRO-LIZA 0.8 MG Oral Tab Take 0.8 mg by mouth daily.    Esomeprazole Magnesium 40 MG Oral Capsule Delayed Release Take 40 mg by mouth every morning before breakfast.   tamsulos [facility]  Phone: [phone]  [email or web address]

## 2017-05-14 NOTE — PROGRESS NOTES
Pulmonary/Critical Care Follow Up Note    HPI:   Aida Wheeler is a 66year old male with Patient presents with:  Jefe Rothman      PCP Steve Durbin MD, MD  Admission Attending Jim Escamilla 15 Day #13    No fever  Occ cough   No mg, 25 mg, Oral, QID  •  Pantoprazole Sodium (PROTONIX) EC tab 40 mg, 40 mg, Oral, QAM AC  •  HYDROcodone-acetaminophen (NORCO) 5-325 MG per tab 2 tablet, 2 tablet, Oral, Q4H PRN  •  isosorbide mononitrate (ISMO,MONOKET) tab 20 mg, 20 mg, Oral, Daily  •  L tomorrow   Need to consider additional drainage    Debility / deconditioning    Plan Aggressive PT/OT    CABG on April 24, 2017  Plan supportive care      Ed-stage renal disease  started recently on HD / renal following  Plan RRT per renal    DVT prophylax

## 2017-05-14 NOTE — PROGRESS NOTES
Pulmonary/Critical Care Follow Up Note    HPI:   Adan Ren is a 66year old male with Patient presents with:  Yair oRjas      PCP Mayo Hurely MD, MD  Admission Attending Bhumika Capone [facility] Day #14    No fever  Occ cough   No (PROTONIX) EC tab 40 mg, 40 mg, Oral, QAM AC  •  HYDROcodone-acetaminophen (NORCO) 5-325 MG per tab 2 tablet, 2 tablet, Oral, Q4H PRN  •  isosorbide mononitrate (ISMO,MONOKET) tab 20 mg, 20 mg, Oral, Daily  •  Levothyroxine Sodium (SYNTHROID, LEVOTHROID) t chest  Plan      cont iv abx , Day 12 today          Loculated left sided pleural effusion   Persistent loculated effusion (undrained) and drained effusion on  CT chest  Smaller pocket with IR placed small bore tube  Plan   Will attempt bedside marquita young

## 2017-05-14 NOTE — PROGRESS NOTES
[facility]    Progress Note    Erica Espino Patient Status:  Inpatient    10/17/1945 MRN [id number]   Location [facility] 3W/SW Attending Martha Mckay MD   Hosp Day # 15 PCP Guillermo Vance MD, MD     Subjective:   Ming Sparrow Results  Component Value Date   WBC 12.1* 05/12/2017   HGB 8.2* 05/12/2017   HCT 25.2* 05/12/2017    05/12/2017   CREATSERUM 6.26* 05/12/2017   BUN 37* 05/12/2017   * 05/12/2017   K 4.5 05/12/2017   CL 97 05/12/2017   CO2 24 05/12/2017   GLU 1

## 2017-05-15 ENCOUNTER — APPOINTMENT (OUTPATIENT)
Dept: ULTRASOUND IMAGING | Facility: HOSPITAL | Age: 72
DRG: 871 | End: 2017-05-15
Attending: INTERNAL MEDICINE
Payer: MEDICAID

## 2017-05-15 PROCEDURE — 76604 US EXAM CHEST: CPT | Performed by: INTERNAL MEDICINE

## 2017-05-15 PROCEDURE — 99233 SBSQ HOSP IP/OBS HIGH 50: CPT | Performed by: INTERNAL MEDICINE

## 2017-05-15 RX ORDER — SODIUM CHLORIDE 9 MG/ML
INJECTION, SOLUTION INTRAVENOUS
Status: DISPENSED
Start: 2017-05-15 | End: 2017-05-15

## 2017-05-15 RX ORDER — ALBUMIN (HUMAN) 12.5 G/50ML
100 SOLUTION INTRAVENOUS AS NEEDED
Status: DISCONTINUED | OUTPATIENT
Start: 2017-05-17 | End: 2017-05-20

## 2017-05-15 RX ORDER — 0.9 % SODIUM CHLORIDE 0.9 %
VIAL (ML) INJECTION
Status: COMPLETED
Start: 2017-05-15 | End: 2017-05-15

## 2017-05-15 NOTE — PAYOR COMM NOTE
[facility] #[id number]  (76 year old M)       [facility] [id number]         Starr Gibbons MD Physician Signed  Progress Notes 5/15/2017  8:46 AM      [address]    Progress Note  [facility] ALB  2.5*  05/15/2017    ALKPHO  62  05/15/2017    BILT  1.5*  05/15/2017    TP  6.3  05/15/2017    AST  16  05/15/2017    ALT  18  05/15/2017    PTT  32.2  05/01/2017    INR  1.5*  05/07/2017    TSH  0.95  05/01/2017    MG  2.4  04/25/2017    PHOS  4.5  He exhibits no distension. There is no rebound and no guarding. Neurological: He is oriented to person, place, and time.    Skin: He is not diaphoretic.    Dry blood in left nostril  Dry blood in abd incision site.        Assessment and Plan:            l

## 2017-05-15 NOTE — OCCUPATIONAL THERAPY NOTE
Chart reviewed, spoke with RN. Patient just left for dialysis this morning, and with possible plan for thoracentesis this afternoon. Will follow up to see patient for OT evaluation as appropriate and as patient is available.

## 2017-05-15 NOTE — PHYSICAL THERAPY NOTE
Pt left for dialysis this morning, and with possible plan for thoracentesis this afternoon.  Will follow up to see patient for PT as appropriate and as patient is available

## 2017-05-15 NOTE — PROGRESS NOTES
Pulmonary/Critical Care Follow Up Note    HPI:   Branden Rousseau is a 66year old male with Patient presents with:  Samina Fermin      PCP Lainey Ponce MD, MD  Admission Attending Jim Land 15 Day #15    No fever  Occ cough   No tab 40 mg, 40 mg, Oral, Nightly  •  bisacodyl (DULCOLAX) EC tab 5 mg, 5 mg, Oral, Daily PRN  •  Calcium Carbonate-Vitamin D (OSCAL-500) 500-200 MG-UNIT per tab, , Oral, Daily  •  cetirizine (ZYRTEC) tab 5 mg, 5 mg, Oral, Daily  •  Pantoprazole Sodium (PROT 05/15/2017   BILT 1.5 05/15/2017   TP 6.3 05/15/2017   AST 16 05/15/2017   ALT 18 05/15/2017         Lab Results  Component Value Date   WBC 11.8* 05/15/2017   WBC 13.2* 05/14/2017   WBC 12.1* 05/12/2017   WBC 11.5* 05/11/2017   WBC 12.7* 05/09/2017

## 2017-05-15 NOTE — OCCUPATIONAL THERAPY NOTE
OCCUPATIONAL THERAPY EVALUATION - INPATIENT     Room Number: 340/340-A  Evaluation Date: 5/15/2017  Type of Evaluation: Initial  Presenting Problem:  (PNA)    Physician Order: IP Consult to Occupational Therapy  Reason for Therapy: ADL/IADL Dysfunction and Device(s): RW    Prior Level of [place]: Prior to April 2017 patient was independent with self-care. Patient was recently at [facility] receiving rehab.     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

## 2017-05-15 NOTE — PLAN OF CARE
Problem: Patient/Family Goals  Goal: Patient/Family Long Term Goal  Patient’s Long Term Goal: to get back to rehab and gain strength to go home safely    Interventions:  - See additional Care Plan goals for specific interventions   Outcome: Progressing  Pt

## 2017-05-15 NOTE — PROGRESS NOTES
[facility]    Progress Note    Aida Wheeler Patient Status:  Inpatient    10/17/1945 MRN [id number]   Location [facility] 3W/SW Attending Koko Marks MD   Hosp Day # 13 PCP Steve Durbin MD, MD       Subjective:   Alber Lewis Sharif Fonseca MD  5/15/2017

## 2017-05-15 NOTE — PHYSICAL THERAPY NOTE
PHYSICAL THERAPY TREATMENT NOTE - INPATIENT    Room Number: 340/340-A       Presenting Problem: pleural effusion    Problem List  Principal Problem:    Pleural effusion  Active Problems:    Sepsis due to pneumonia [facility])    Hypoxia      ASSESSMENT   Pt see assistance  Distance (ft): 2 x 50  Assistive Device: Rolling walker  Pattern: Shuffle  Stoop/Curb Assistance: Not tested  Comment : pt refused gait this PM    Additional information:     THERAPEUTIC EXERCISES  Lower Extremity   Ap,QS,GS,abd/add,HS   Positi

## 2017-05-16 ENCOUNTER — APPOINTMENT (OUTPATIENT)
Dept: INTERVENTIONAL RADIOLOGY/VASCULAR | Facility: HOSPITAL | Age: 72
DRG: 871 | End: 2017-05-16
Attending: RADIOLOGY
Payer: MEDICAID

## 2017-05-16 PROCEDURE — 99231 SBSQ HOSP IP/OBS SF/LOW 25: CPT | Performed by: INTERNAL MEDICINE

## 2017-05-16 PROCEDURE — 0WPBX0Z REMOVAL OF DRAINAGE DEVICE FROM LEFT PLEURAL CAVITY, EXTERNAL APPROACH: ICD-10-PCS | Performed by: RADIOLOGY

## 2017-05-16 NOTE — PROGRESS NOTES
Patient seen in follow up. Patient denies any chest pain has still some sob.    He is going for thoracentesis by IR  D/w nurse  Chart reviewed   05/16/17  0831   BP: 121/74   Pulse: 78   Temp: 99.2 °F (37.3 °C)   Resp: 18       Intake/Output Summary ( Calcium Carbonate-Vitamin D (OSCAL-500) 500-200 MG-UNIT per tab  Oral Daily   cetirizine (ZYRTEC) tab 5 mg 5 mg Oral Daily   Pantoprazole Sodium (PROTONIX) EC tab 40 mg 40 mg Oral QAM AC   HYDROcodone-acetaminophen (NORCO) 5-325 MG per tab 2 tablet 2 table Levothyroxine Sodium 25 MCG Oral Tab Take 25 mcg by mouth before breakfast.   NEPHRO-LIZA 0.8 MG Oral Tab Take 0.8 mg by mouth daily.    Esomeprazole Magnesium 40 MG Oral Capsule Delayed Release Take 40 mg by mouth every morning before breakfast.   tamsulos

## 2017-05-16 NOTE — PROGRESS NOTES
[facility]    Progress Note     Patient Status:  Inpatient    10/17/1945 MRN [id number]   Location [facility] 3W/SW Attending Gali Valles MD   Hosp Day # 13 PCP Kartik Camp MD, MD     Subjective:   Gladis Marrufo

## 2017-05-16 NOTE — PROGRESS NOTES
Pulmonary/Critical Care Follow Up Note    HPI:   Jess Lei is a 66year old male with Patient presents with:  Dominique Dockery      PCP Genevieve Alvarez MD, MD  Admission Attending Jim Vaughn 15 Day #16    Ros neg x 3 system    PM AC  •  HYDROcodone-acetaminophen (NORCO) 5-325 MG per tab 2 tablet, 2 tablet, Oral, Q4H PRN  •  isosorbide mononitrate (ISMO,MONOKET) tab 20 mg, 20 mg, Oral, Daily  •  Levothyroxine Sodium (SYNTHROID, LEVOTHROID) tab 25 mcg, 25 mcg, Oral, Before breakfast prophylaxis  Plan SQ hep        Jaqueilne Pond MD

## 2017-05-16 NOTE — PLAN OF CARE
VSS, patient denies pain. ambien given per request. IV ABX D/C'd tonight, will continue to monitor patient w/o ABX per MD. Dialysis removed 5467 yesterday, next scheduled for Wednesday 5/17/17. Monitoring.

## 2017-05-16 NOTE — PROGRESS NOTES
Patient seen in follow up. Patient denies any chest pain has still some sob and cough.    Was getting US of chest for possible thoracentesis  D/w nurse  Chart reviewed   05/15/17  1900   BP: 117/87   Pulse: 73   Temp: 98.7 °F (37.1 °C)   Resp: 18 Calcium Carbonate-Vitamin D (OSCAL-500) 500-200 MG-UNIT per tab  Oral Daily   cetirizine (ZYRTEC) tab 5 mg 5 mg Oral Daily   Pantoprazole Sodium (PROTONIX) EC tab 40 mg 40 mg Oral QAM AC   HYDROcodone-acetaminophen (NORCO) 5-325 MG per tab 2 tablet 2 table Levothyroxine Sodium 25 MCG Oral Tab Take 25 mcg by mouth before breakfast.   NEPHRO-LIZA 0.8 MG Oral Tab Take 0.8 mg by mouth daily.    Esomeprazole Magnesium 40 MG Oral Capsule Delayed Release Take 40 mg by mouth every morning before breakfast.   tamsulos Hossein Camacho MD [facility] [place] [address] [facility]  Phone: [phone]  [email or web address]

## 2017-05-17 PROCEDURE — 99232 SBSQ HOSP IP/OBS MODERATE 35: CPT | Performed by: INTERNAL MEDICINE

## 2017-05-17 RX ORDER — ALBUMIN (HUMAN) 12.5 G/50ML
100 SOLUTION INTRAVENOUS AS NEEDED
Status: DISCONTINUED | OUTPATIENT
Start: 2017-05-18 | End: 2017-05-20

## 2017-05-17 NOTE — DIETARY NOTE
ADULT NUTRITION RE-ASSESSMENT    Pt is at moderate nutrition risk. Pt does not meet malnutrition criteria.       RECOMMENDATIONS TO MD: RD managing oral nutriitional supplementation(ONS)     NUTRITION DIAGNOSIS/PROBLEM:  Unintentional weight loss related t supplements: vitamin c and Nephrovite   - Feeding assistance: meal set up and menu selection assistance  - Nutrition education: Encouraged continued good intake of protein foods.   - Coordination of nutrition care: collaboration with other providers  - Dis Nightly   • DM-Guaifenesin ER  1 tablet Oral BID   • insulin aspart  1-5 Units Subcutaneous TID CC   • vancomycin  125 mg Oral BID   • metoprolol Tartrate  25 mg Oral 2x Daily(Beta Blocker)   • Heparin Sodium (Porcine)  5,000 Units Subcutaneous Q8H [facility]   • of needs, good supplement intake, euglycemia, support wound healing, compliance with diet and compliance with medical plan    DIETITIAN FOLLOW UP: RD to follow up within 7 days.    [address], [address] [facility]  Ext [phone]

## 2017-05-17 NOTE — PROGRESS NOTES
Note from pulm reviewed. No cp. No abd pain. No cough.    05/17/17  1623   BP: 129/79   Pulse: 113   Temp:    Resp: 18       Intake/Output Summary (Last 24 hours) at 05/17/17 1815  Last data filed at 05/17/17 1300   Gross per 24 hour   Intake    250 Calcium Carbonate-Vitamin D (OSCAL-500) 500-200 MG-UNIT per tab  Oral Daily   cetirizine (ZYRTEC) tab 5 mg 5 mg Oral Daily   Pantoprazole Sodium (PROTONIX) EC tab 40 mg 40 mg Oral QAM AC   HYDROcodone-acetaminophen (NORCO) 5-325 MG per tab 2 tablet 2 table Levothyroxine Sodium 25 MCG Oral Tab Take 25 mcg by mouth before breakfast.   NEPHRO-LIZA 0.8 MG Oral Tab Take 0.8 mg by mouth daily.    Esomeprazole Magnesium 40 MG Oral Capsule Delayed Release Take 40 mg by mouth every morning before breakfast.   tamsulos

## 2017-05-17 NOTE — PROGRESS NOTES
[facility]    Progress Note    Jess Lei Patient Status:  Inpatient    10/17/1945 MRN [id number]   Location [facility] 3W/SW Attending Js Wen MD   Hosp Day # 12 PCP Genevieve Alvarez MD, MD     Subjective:   Kamari Martinez Results:     Lab Results  Component Value Date   WBC 11.8* 05/15/2017   HGB 8.5* 05/15/2017   HCT 26.2* 05/15/2017    05/15/2017   CREATSERUM 8.03* 05/15/2017   BUN 43* 05/15/2017    05/15/2017   K 4.7 05/15/

## 2017-05-17 NOTE — PROGRESS NOTES
[facility]    Progress Note    Herve Javed Patient Status:  Inpatient    10/17/1945 MRN [id number]   Location [facility] 3W/SW Attending Donaldo Harrell MD   Hosp Day # 16 PCP Myriam Molina MD, MD       Subjective:   Olga Dong Zeina Rogers MD  5/17/2017

## 2017-05-17 NOTE — OCCUPATIONAL THERAPY NOTE
Attempted to see pt for OT session, however per RN Eddi Parnell pt at dialysis at this time. Will continue to follow.

## 2017-05-17 NOTE — PAYOR COMM NOTE
Progress Notes by Padmini Dodson MD at 5/16/2017 10:27 AM      Author: Padmini Dodson MD Service: (none) Author Type: Physician     Filed: 5/16/2017 10:32 AM Note Time: 5/16/2017 10:27 AM Status: Signed     : Padmini Dodson MD (Physician)       Glucose-Vitamin C (DEX-4) 4-0.006 g chewable tab 4 tablet  4 tablet  Oral  Q15 Min PRN    AmLODIPine Besylate (NORVASC) tab 10 mg  10 mg  Oral  Daily    aspirin EC tab 81 mg  81 mg  Oral  Daily    atorvastatin (LIPITOR) tab 40 mg  40 mg  Oral  Nightly    b Clopidogrel Bisulfate (PLAVIX) 75 MG Oral Tab  Take 1 tablet (75 mg total) by mouth daily.    Vitamin C 500 MG Oral Tab  Take 1 tablet (500 mg total) by mouth 3 (three) times daily.    Cetirizine HCl 5 MG Oral Tab  Take by mouth daily.    Calcium-Phosphoru Cardiac wise is stable, no changes today. Mynor Parks MD [facility] [place]  [address]  Phone: [phone]  [email or web address]                Progress Notes by Betzy Byrnes MD at 5/15/2017  6:52 PM      Author:  Clinton Machado •  Heparin Sodium (Porcine) 5000 UNIT/ML injection 5,000 Units, 5,000 Units, Subcutaneous, Q8H [address]  •  Zolpidem Tartrate (AMBIEN) tab 5 mg, 5 mg, Oral, Nightly PRN  •  Piperacillin Sod-Tazobactam So (ZOSYN) 3.375 g in dextrose 5 % 100 mL IVPB, 3.375 g, Int Intake/Output Summary (Last 24 hours) at 05/15/17 1852  Last data filed at 05/15/17 1818    Gross per 24 hour    Intake    1230 ml    Output    2859 ml    Net   -1629 ml      NAD  Lung occ crackle bases, dec bs over L upper zone  Cv Reg  Abd sott + BS  Ext Ed-stage renal disease  started recently on HD / renal following  Plan RRT per renal    DVT prophylaxis  Plan SQ hep        Alen Langley MD                                Progress Notes by Melinda Anderson APN at 5/16/2017  1:40 PM      Author: Alyssa Kellogg •  vancomycin (FIRST-VANCOMYCIN 50) 50 MG/ML oral solution 125 mg, 125 mg, Oral, BID  •  metoprolol Tartrate (LOPRESSOR) tab 25 mg, 25 mg, Oral, 2x Daily(Beta Blocker)  •  Heparin Sodium (Porcine) 5000 UNIT/ML injection 5,000 Units, 5,000 Units, Subcutaneo Intake     250 ml    Output       0 ml    Net     250 ml      NAD  A/OX 3  Nl mood  Ext trace edema      LABS:           Lab Results  Component  Value  Date    [facility] ED SHEA 11.8*  05/15/2017    WBC  13.2*  05/14/2017    WBC  12.1*  05/12/2017    WBC  11.5*  05/11/ •  [START ON 5/17/2017] Albumin Human (ALBUMINAR) 25 % solution 100 mL, 100 mL, Intravenous, PRN  •  insulin detemir (LEVEMIR) 100 UNIT/ML flextouch 10 Units, 10 Units, Subcutaneous, Nightly  •  benzonatate (TESSALON) cap 200 mg, 200 mg, Oral, TID PRN  •  •  ipratropium-albuterol (DUONEB) nebulizer solution 3 mL, 3 mL, Nebulization, Q6H PRN  •  epoetin rosendo (EPOGEN,PROCRIT) 65216 UNIT/ML injection 3,000 Units, 3,000 Units, Subcutaneous, Once per day on Mon Wed Fri      Allergies:  No Known Allergies         Shamar Hansen Patient Status:  Inpatient    Renetta Santa [id number]  [address] [facility] [id number]    [facility] 3W/SW  Attending  Raf Walker MD    Hosp Day #  16  Delvis Canales MD, MD        Subjective:    Subjective:      Objective:    Blood                             Results:      Lab Results  Component  Value  Date    [facility] SHEA 11.8*  05/15/2017    HGB  8.5*  05/15/2017    HCT  26.2*  05/15/2017    PLT  390  05/15/2017    CREATSERUM  8.03*  05/15/2017    BUN  43*  05/15/2017    NA  137  05/15/20

## 2017-05-17 NOTE — PROGRESS NOTES
Pulmonary/Critical Care Follow Up Note    HPI:   Dawna Patel is a 66year old male with Patient presents with:  Larisa Douglass      PCP Neal Palafox MD, MD  Admission Attending Jim Vasquez 15 Day #17    Ros neg x 4 system    PM Oral, Daily  •  Pantoprazole Sodium (PROTONIX) EC tab 40 mg, 40 mg, Oral, QAM AC  •  HYDROcodone-acetaminophen (NORCO) 5-325 MG per tab 2 tablet, 2 tablet, Oral, Q4H PRN  •  isosorbide mononitrate (ISMO,MONOKET) tab 20 mg, 20 mg, Oral, Daily  •  Levothyrox pleural effusion   Small pocket drained by IR- all cx neg  Larger apical pocket- Very thick walled and complex loculations  Not able to sample at bedside w/o placing needle into axila  A/P      Rec following w/o addtitional intervention   D/w dr Dilcia Meza an

## 2017-05-18 PROCEDURE — 99232 SBSQ HOSP IP/OBS MODERATE 35: CPT | Performed by: INTERNAL MEDICINE

## 2017-05-18 NOTE — PROGRESS NOTES
Pulmonary/Critical Care Follow Up Note    HPI:   Joe Ortez is a 66year old male with Patient presents with:  Benjamin Mckeon      PCP Brad Thao MD, MD  Admission Attending Jim Godoy 15 Day #18    Ros neg x 3 system    PM Daily  •  Pantoprazole Sodium (PROTONIX) EC tab 40 mg, 40 mg, Oral, QAM AC  •  HYDROcodone-acetaminophen (NORCO) 5-325 MG per tab 2 tablet, 2 tablet, Oral, Q4H PRN  •  isosorbide mononitrate (ISMO,MONOKET) tab 20 mg, 20 mg, Oral, Daily  •  Levothyroxine So 99.6  Wbc stable  Off abx  Plan     Follow wbc   Follow temps       Loculated left sided pleural effusion   Small pocket drained by IR- all cx neg  Larger apical pocket- Very thick walled and complex loculations  Not able to sample at bedside w/o placing n

## 2017-05-18 NOTE — PROGRESS NOTES
No cp.  No abd pain. No cough. Has diarrhea   Seen by GI  D/w nurse  Chart reviewed.      05/18/17  1316   BP: 97/46   Pulse: 84   Temp: 99.1 °F (37.3 °C)   Resp: 16       Intake/Output Summary (Last 24 hours) at 05/18/17 1814  Last data filed at 05/ Calcium Carbonate-Vitamin D (OSCAL-500) 500-200 MG-UNIT per tab  Oral Daily   cetirizine (ZYRTEC) tab 5 mg 5 mg Oral Daily   Pantoprazole Sodium (PROTONIX) EC tab 40 mg 40 mg Oral QAM AC   HYDROcodone-acetaminophen (NORCO) 5-325 MG per tab 2 tablet 2 table Levothyroxine Sodium 25 MCG Oral Tab Take 25 mcg by mouth before breakfast.   NEPHRO-LIZA 0.8 MG Oral Tab Take 0.8 mg by mouth daily.    Esomeprazole Magnesium 40 MG Oral Capsule Delayed Release Take 40 mg by mouth every morning before breakfast.   tamsulos [facility]  Phone: [phone]  [email or web address]

## 2017-05-18 NOTE — PROGRESS NOTES
[facility]    Progress Note    Bairon Johns Patient Status:  Inpatient    10/17/1945 MRN [id number]   Location [facility] 3W/SW Attending Nolan Mac MD   Hosp Day # 25 PCP Carlos Silverio MD, MD     Subjective:   Kike Mesa Results:       Lab Results  Component Value Date   WBC 12.0* 05/17/2017   HGB 8.6* 05/17/2017   HCT 26.0* 05/17/2017    05/17/2017   CREATSERUM 7.24* 05/17/2017   BUN 48* 05/17/2017    05/17/2017   K 4.4 05/17/2017    05/

## 2017-05-18 NOTE — CONSULTS
[facility]    Report of Consultation    Maryam Peterson Patient Status:  Inpatient    10/17/1945 MRN [id number]   Location [facility] 3W/SW Attending Louisa Bill MD   Hosp Day # 25 PCP Nnamdi Vazquez MD, MD     Date of Admi 25 % solution 100 mL 100 mL Intravenous PRN   insulin detemir (LEVEMIR) 100 UNIT/ML flextouch 10 Units 10 Units Subcutaneous Nightly   benzonatate (TESSALON) cap 200 mg 200 mg Oral TID PRN   DM-Guaifenesin ER (MUCINEX DM)  MG per 12 hr tab 1 tablet 1 apixaban 2.5 MG Oral Tab Take 2.5 mg by mouth 2 (two) times daily. atorvastatin 40 MG Oral Tab Take 1 tablet (40 mg total) by mouth nightly. metoprolol Tartrate 25 MG Oral Tab Take 0.5 tablets (12.5 mg total) by mouth 2x Daily(Beta Blocker).    AmLODI is heard at 2nd left intercostal space  Abdominal: soft, non-tender; bowel sounds normal; no masses,  no organomegaly  Extremities: extremities normal, atraumatic, no cyanosis or edema  Psychiatric: calm    Results:     Laboratory Data:    Lab Results  Com

## 2017-05-18 NOTE — OCCUPATIONAL THERAPY NOTE
OCCUPATIONAL THERAPY TREATMENT NOTE - INPATIENT     Room Number: 340/340-A   Presenting Problem:  (PNA)    Problem List  Principal Problem:    Pleural effusion  Active Problems:    Sepsis due to pneumonia [facility])    Hypoxia      ASSESSMENT   Notified RN prio care of personal grooming such as brushing teeth?: A Little  -   Eating meals?: A Little    AM-PAC Score:  Score: 18  Approx Degree of Impairment: 46.65%  Standardized Score (AM-PAC Scale): 38.66  CMS Modifier (G-Code): CK    FUNCTIONAL TRANSFER ASSESSMENT

## 2017-05-18 NOTE — PHYSICAL THERAPY NOTE
PHYSICAL THERAPY TREATMENT NOTE - INPATIENT    Room Number: 340/340-A       Presenting Problem: pleural effusion    Problem List  Principal Problem:    Pleural effusion  Active Problems:    Sepsis due to pneumonia [facility])    Hypoxia      ASSESSMENT   Pt see -   Need to walk in hospital room?: A Little   -   Climbing 3-5 steps with a railing?: A Little    AM-PAC Score:  Raw Score: 20   PT Approx Degree of Impairment Score: 35.83%   Standardized Score (AM-PAC Scale): 47.67   CMS Modifier (G-Code): JUAN HOYOS

## 2017-05-18 NOTE — PROGRESS NOTES
[facility]    Progress Note    Dawna Patel Patient Status:  Inpatient    10/17/1945 MRN [id number]   Location [facility] 3W/SW Attending Asha Hodges MD   Hosp Day # 16 PCP Neal Palafox MD, MD     Subjective:   Isaías Degree Results:     Lab Results  Component Value Date   WBC 12.0* 05/17/2017   HGB 8.6* 05/17/2017   HCT 26.0* 05/17/2017    05/17/2017   CREATSERUM 7.24* 05/17/2017   BUN 48* 05/17/2017    05/17/2017

## 2017-05-18 NOTE — DISCHARGE PLANNING
5/18CM-This Writer met with the Patient's son Estela Pradhan ([phone]) at bedside.  The Patient's son informed this Writer that he doesn't want the Patient to return to [facility]53 and requested this Writer to set up the Patient outpatient dialysis c

## 2017-05-19 ENCOUNTER — APPOINTMENT (OUTPATIENT)
Dept: GENERAL RADIOLOGY | Facility: HOSPITAL | Age: 72
DRG: 871 | End: 2017-05-19
Attending: THORACIC SURGERY (CARDIOTHORACIC VASCULAR SURGERY)
Payer: MEDICAID

## 2017-05-19 PROCEDURE — 99232 SBSQ HOSP IP/OBS MODERATE 35: CPT | Performed by: INTERNAL MEDICINE

## 2017-05-19 PROCEDURE — 71020 XR CHEST PA + LAT CHEST (CPT=71020): CPT | Performed by: THORACIC SURGERY (CARDIOTHORACIC VASCULAR SURGERY)

## 2017-05-19 RX ORDER — GLIMEPIRIDE 1 MG/1
1 TABLET ORAL
Qty: 2 TABLET | Refills: 0 | Status: SHIPPED | OUTPATIENT
Start: 2017-05-19 | End: 2017-05-20

## 2017-05-19 RX ORDER — ACETAMINOPHEN 500 MG
500 TABLET ORAL EVERY 6 HOURS PRN
Status: DISCONTINUED | OUTPATIENT
Start: 2017-05-19 | End: 2017-05-20

## 2017-05-19 NOTE — PROGRESS NOTES
[facility]    Progress Note    Tyree Gee Patient Status:  Inpatient    10/17/1945 MRN [id number]   Location [facility] 3W/SW Attending Suzette Randolph MD   Hosp Day # 23 PCP Beny Al MD, MD       Subjective:   Eri Sinha

## 2017-05-19 NOTE — DISCHARGE PLANNING
5/19CM-The Patient's MD is agreeable with the Patient starting on Tuesday (5/23) and the Patient will be discharged tomorrow (5/20) after a short dialysis.  This Writer has informed [facility] Admissions of the above, the Patient will be scheduled for Tuesda

## 2017-05-19 NOTE — CM/SW NOTE
[facility]  Face to Face Encounter Note    Nawaf Walls Patient Status:  Inpatient    10/17/1945 MRN [id number]   Location [facility] 3W/SW Attending Jocelyne Quinonez MD   Hosp Day # 23 PCP Andrea Al MD, MD       I, or a non-physic establishment of the plan of care. This physician will be followed by a physician who will periodically review the plan of care.   The findings from this face-to-face encounter have been communicated with the patient's community-based physician who will be

## 2017-05-19 NOTE — PROGRESS NOTES
[facility]    Progress Note    Joe Ortez Patient Status:  Inpatient    10/17/1945 MRN [id number]   Location [facility] 3W/SW Attending Yanely Bravo MD   Hosp Day # 23 PCP Brad Thao MD, MD     Subjective:   Chase Rushing Sternotomy changes. 2. Extensive left apical pleural parenchymal abnormality with pleural thickening in the left paratracheal region, left lung apex and superolateral left hemithorax. Slight improvement.  Superimposed scarring/atelectasis in the left upper

## 2017-05-19 NOTE — PROGRESS NOTES
[facility]    Progress Note    Diandra Rede Patient Status:  Inpatient    10/17/1945 MRN [id number]   Location [facility] 3W/SW Attending Gino De Jesus MD   Hosp Day # 23 PCP Katherine Castillo MD, MD     Subjective:   Alice Dixon Results:     Lab Results  Component Value Date   WBC 13.1* 05/19/2017   HGB 8.9* 05/19/2017   HCT 28.1* 05/19/2017    05/19/2017   CREATSERUM 6.88* 05/19/2017   BUN 42* 05/19/2017    05/19/2017   K 4.1 05/19/2017    05/19/201

## 2017-05-19 NOTE — CM/SW NOTE
Patient does not want to go to Critical access hospital. Dr. Rahel Soto indicates need for [facility]. Referral to [facility] RN/PT/OT sent per Charleen Posada via [facility].  Orders and face to face in chart, face to face needs cosign per MD.    Dee Pereira

## 2017-05-19 NOTE — PROGRESS NOTES
[facility]    [facility] Note    Can Parnell Patient Status:  Inpatient    10/17/1945 MRN [id number]   Location [facility] 3W/SW Attending George Benson MD   Hosp Day # 23 PCP Verneita Lennox, MD, MD       Subjective:   Arron Johnston

## 2017-05-20 VITALS
OXYGEN SATURATION: 99 % | RESPIRATION RATE: 20 BRPM | HEART RATE: 97 BPM | TEMPERATURE: 98 F | HEIGHT: 67 IN | SYSTOLIC BLOOD PRESSURE: 108 MMHG | BODY MASS INDEX: 20.9 KG/M2 | WEIGHT: 133.19 LBS | DIASTOLIC BLOOD PRESSURE: 58 MMHG

## 2017-05-20 RX ORDER — SODIUM CHLORIDE 9 MG/ML
INJECTION, SOLUTION INTRAVENOUS
Status: COMPLETED
Start: 2017-05-20 | End: 2017-05-20

## 2017-05-20 RX ORDER — GLIMEPIRIDE 1 MG/1
1 TABLET ORAL DAILY
Qty: 30 TABLET | Refills: 2 | Status: SHIPPED | OUTPATIENT
Start: 2017-05-20

## 2017-05-20 NOTE — DISCHARGE PLANNING
SW confirmed the pt was accepted by [facility]. SW spoke with weekend intake who are aware of dc for today 5/20. They have all the orders. Per previous notation, pt's HD was confirmed for [facility], first visit on Tues.     [facility] [place] [phone]

## 2017-05-20 NOTE — PROGRESS NOTES
No cp.  No abd pain. No cough. Soft BP  D/w nurse  Chart reviewed.      05/19/17 2008   BP: 98/50   Pulse: 99   Temp: 99.9 °F (37.7 °C)   Resp: 18       Intake/Output Summary (Last 24 hours) at 05/19/17 2026  Last data filed at 05/19/17 1844   Gross bisacodyl (DULCOLAX) EC tab 5 mg 5 mg Oral Daily PRN   Calcium Carbonate-Vitamin D (OSCAL-500) 500-200 MG-UNIT per tab  Oral Daily   cetirizine (ZYRTEC) tab 5 mg 5 mg Oral Daily   Pantoprazole Sodium (PROTONIX) EC tab 40 mg 40 mg Oral QAM AC   HYDROcodone- NEPHRO-LIZA 0.8 MG Oral Tab Take 0.8 mg by mouth daily. Esomeprazole Magnesium 40 MG Oral Capsule Delayed Release Take 40 mg by mouth every morning before breakfast.   tamsulosin HCl 0.4 MG Oral Cap Take 0.4 mg by mouth daily.      Xr Chest Pa + Lat Chest ? Extra fluid removal from HD  Hold BP medication for low BP  No changes today    Luis Royal MD [facility]  [address]  [address], [place]  Phone: [phone]  [email or web address]

## 2017-05-20 NOTE — PLAN OF CARE
PAIN - ADULT    • Verbalizes/displays adequate comfort level or patient's stated pain goal Progressing        Patient/Family Goals    • Patient/Family Long Term Goal Progressing    • Patient/Family Short Term Goal Progressing        Continue to monitor pat

## 2017-05-20 NOTE — PROGRESS NOTES
[facility]    Progress Note    Tacey Alpers Patient Status:  Inpatient    10/17/1945 MRN [id number]   Location [facility] 3W/SW Attending Melvin Self MD   Hosp Day # 21 PCP Jackson Tinsley MD, MD       Subjective:   Abbie Yancey CONCLUSION:  1. Mild cardiomegaly decreased. CABG. Sternotomy changes. 2. Extensive left apical pleural parenchymal abnormality with pleural thickening in the left paratracheal region, left lung apex and superolateral left hemithorax. Slight improvement.  Isabel Davies

## 2017-05-21 ENCOUNTER — TELEPHONE (OUTPATIENT)
Dept: CARDIOLOGY UNIT | Facility: HOSPITAL | Age: 72
End: 2017-05-21

## 2017-05-24 NOTE — PAYOR COMM NOTE
DISCHARGED 5/20    Kalli Calzada #[id number]  (76 year old M)       [facility] [id number]         Kendra Yousif MD Physician Signed Nephrology Progress Notes 5/20/2017  9:26 AM      Expand All Collapse All    [facility]    Progress Not 24*   42*   23*    CREATSERUM   4.84*  4.84*   6.88*   4.92*    GFRAA   14*  14*   10*   14*    GFRNAA   12*  12*   8*   12*    CA   9.0  9.0   8.9   8.7    NA   813  137   460   141    K   4.2  4.2   4.1   3.8    CL   99  99   101   98    CO2   26  26   2

## 2017-05-29 ENCOUNTER — LAB ENCOUNTER (OUTPATIENT)
Dept: LAB | Facility: HOSPITAL | Age: 72
End: 2017-05-29
Attending: INTERNAL MEDICINE
Payer: MEDICAID

## 2017-05-29 DIAGNOSIS — R50.9 FEVER: Primary | ICD-10-CM

## 2017-05-29 PROCEDURE — 85027 COMPLETE CBC AUTOMATED: CPT

## 2017-05-29 PROCEDURE — 36415 COLL VENOUS BLD VENIPUNCTURE: CPT

## 2017-05-29 PROCEDURE — 85025 COMPLETE CBC W/AUTO DIFF WBC: CPT

## 2017-05-29 PROCEDURE — 85007 BL SMEAR W/DIFF WBC COUNT: CPT

## 2017-06-03 ENCOUNTER — LAB ENCOUNTER (OUTPATIENT)
Dept: LAB | Age: 72
End: 2017-06-03
Attending: INTERNAL MEDICINE
Payer: MEDICAID

## 2017-06-03 ENCOUNTER — HOSPITAL ENCOUNTER (OUTPATIENT)
Dept: GENERAL RADIOLOGY | Age: 72
Discharge: HOME | End: 2017-06-03
Attending: INTERNAL MEDICINE
Payer: MEDICAID

## 2017-06-03 DIAGNOSIS — J18.9 PNEUMONIA: ICD-10-CM

## 2017-06-03 DIAGNOSIS — D64.9 ANEMIA: Primary | ICD-10-CM

## 2017-06-03 PROCEDURE — 85027 COMPLETE CBC AUTOMATED: CPT

## 2017-06-03 PROCEDURE — 85025 COMPLETE CBC W/AUTO DIFF WBC: CPT

## 2017-06-03 PROCEDURE — 83540 ASSAY OF IRON: CPT

## 2017-06-03 PROCEDURE — 71020 XR CHEST PA + LAT CHEST (CPT=71020): CPT | Performed by: INTERNAL MEDICINE

## 2017-06-03 PROCEDURE — 36415 COLL VENOUS BLD VENIPUNCTURE: CPT

## 2017-06-03 PROCEDURE — 85007 BL SMEAR W/DIFF WBC COUNT: CPT

## 2017-06-03 PROCEDURE — 84466 ASSAY OF TRANSFERRIN: CPT

## 2017-06-05 NOTE — DISCHARGE SUMMARY
[facility]    PATIENT'S NAME: Kurtis Beach   ATTENDING PHYSICIAN: Tori He MD   PATIENT ACCOUNT#:   [id number]    LOCATION:  [address] [place] [address] #:   [id number]       YOB: 1945  ADMISSION DATE:       04/30/ of that, he will go home, and then I had a long discussion with the family member, especially the son, and it was decided that the patient would go to home with home health care support. The patient was then discharged home in a stable condition.     [facility]

## 2017-10-23 ENCOUNTER — HOSPITAL ENCOUNTER (OUTPATIENT)
Dept: GENERAL RADIOLOGY | Age: 72
Discharge: HOME | End: 2017-10-23
Attending: INTERNAL MEDICINE
Payer: MEDICAID

## 2017-10-23 DIAGNOSIS — M25.561 RIGHT KNEE PAIN, UNSPECIFIED CHRONICITY: ICD-10-CM

## 2017-10-23 PROCEDURE — 73560 X-RAY EXAM OF KNEE 1 OR 2: CPT | Performed by: INTERNAL MEDICINE

## 2017-12-01 PROBLEM — M25.561 RIGHT KNEE PAIN, UNSPECIFIED CHRONICITY: Status: ACTIVE | Noted: 2017-12-01

## 2017-12-31 ENCOUNTER — LAB ENCOUNTER (OUTPATIENT)
Dept: LAB | Facility: HOSPITAL | Age: 72
End: 2017-12-31
Attending: INTERNAL MEDICINE
Payer: MEDICAID

## 2017-12-31 DIAGNOSIS — N18.6 END STAGE RENAL DISEASE (HCC): ICD-10-CM

## 2017-12-31 DIAGNOSIS — E11.9 DIABETES MELLITUS (HCC): Primary | ICD-10-CM

## 2017-12-31 DIAGNOSIS — R31.9 HEMATURIA, UNSPECIFIED: ICD-10-CM

## 2017-12-31 DIAGNOSIS — Z99.2 ENCOUNTER FOR EXTRACORPOREAL DIALYSIS (HCC): ICD-10-CM

## 2017-12-31 LAB
ALBUMIN SERPL BCP-MCNC: 3.9 G/DL (ref 3.5–4.8)
ALBUMIN/GLOB SERPL: 1.4 {RATIO} (ref 1–2)
ALP SERPL-CCNC: 75 U/L (ref 32–100)
ALT SERPL-CCNC: 17 U/L (ref 17–63)
ANION GAP SERPL CALC-SCNC: 11 MMOL/L (ref 0–18)
APTT PPP: 29.2 SECONDS (ref 23.2–35.3)
AST SERPL-CCNC: 20 U/L (ref 15–41)
BASOPHILS # BLD: 0.1 K/UL (ref 0–0.2)
BASOPHILS NFR BLD: 1 %
BILIRUB SERPL-MCNC: 0.6 MG/DL (ref 0.3–1.2)
BUN SERPL-MCNC: 29 MG/DL (ref 8–20)
BUN/CREAT SERPL: 5.8 (ref 10–20)
CALCIUM SERPL-MCNC: 8.8 MG/DL (ref 8.5–10.5)
CHLORIDE SERPL-SCNC: 95 MMOL/L (ref 95–110)
CO2 SERPL-SCNC: 35 MMOL/L (ref 22–32)
CREAT SERPL-MCNC: 4.98 MG/DL (ref 0.5–1.5)
EOSINOPHIL # BLD: 0.2 K/UL (ref 0–0.7)
EOSINOPHIL NFR BLD: 4 %
ERYTHROCYTE [DISTWIDTH] IN BLOOD BY AUTOMATED COUNT: 15.3 % (ref 11–15)
GLOBULIN PLAS-MCNC: 2.7 G/DL (ref 2.5–3.7)
GLUCOSE SERPL-MCNC: 164 MG/DL (ref 70–99)
HCT VFR BLD AUTO: 34.1 % (ref 41–52)
HGB BLD-MCNC: 11.1 G/DL (ref 13.5–17.5)
INR BLD: 1.2 (ref 0.9–1.2)
LYMPHOCYTES # BLD: 1.2 K/UL (ref 1–4)
LYMPHOCYTES NFR BLD: 20 %
MCH RBC QN AUTO: 27.2 PG (ref 27–32)
MCHC RBC AUTO-ENTMCNC: 32.4 G/DL (ref 32–37)
MCV RBC AUTO: 84.1 FL (ref 80–100)
MONOCYTES # BLD: 0.6 K/UL (ref 0–1)
MONOCYTES NFR BLD: 9 %
NEUTROPHILS # BLD AUTO: 4.2 K/UL (ref 1.8–7.7)
NEUTROPHILS NFR BLD: 67 %
OSMOLALITY UR CALC.SUM OF ELEC: 301 MOSM/KG (ref 275–295)
PLATELET # BLD AUTO: 186 K/UL (ref 140–400)
PMV BLD AUTO: 8.2 FL (ref 7.4–10.3)
POTASSIUM SERPL-SCNC: 3.9 MMOL/L (ref 3.3–5.1)
PROT SERPL-MCNC: 6.6 G/DL (ref 5.9–8.4)
PROTHROMBIN TIME: 14.5 SECONDS (ref 11.8–14.5)
RBC # BLD AUTO: 4.06 M/UL (ref 4.5–5.9)
SODIUM SERPL-SCNC: 141 MMOL/L (ref 136–144)
WBC # BLD AUTO: 6.3 K/UL (ref 4–11)

## 2017-12-31 PROCEDURE — 85610 PROTHROMBIN TIME: CPT

## 2017-12-31 PROCEDURE — 36415 COLL VENOUS BLD VENIPUNCTURE: CPT

## 2017-12-31 PROCEDURE — 85025 COMPLETE CBC W/AUTO DIFF WBC: CPT

## 2017-12-31 PROCEDURE — 85730 THROMBOPLASTIN TIME PARTIAL: CPT

## 2017-12-31 PROCEDURE — 80053 COMPREHEN METABOLIC PANEL: CPT

## 2018-01-15 ENCOUNTER — HOSPITAL ENCOUNTER (OUTPATIENT)
Dept: GENERAL RADIOLOGY | Age: 73
Discharge: HOME | End: 2018-01-15
Attending: INTERNAL MEDICINE
Payer: MEDICAID

## 2018-01-15 ENCOUNTER — LAB ENCOUNTER (OUTPATIENT)
Dept: LAB | Age: 73
End: 2018-01-15
Attending: INTERNAL MEDICINE
Payer: MEDICAID

## 2018-01-15 DIAGNOSIS — D64.9 ANEMIA: Primary | ICD-10-CM

## 2018-01-15 DIAGNOSIS — N18.6 END-STAGE RENAL DISEASE (HCC): ICD-10-CM

## 2018-01-15 LAB
IRON SATN MFR SERPL: 56 % (ref 20–55)
IRON SERPL-MCNC: 106 MCG/DL (ref 45–182)
TIBC SERPL-MCNC: 190 MCG/DL (ref 228–428)
TRANSFERRIN SERPL-MCNC: 144 MG/DL (ref 180–329)

## 2018-01-15 PROCEDURE — 84466 ASSAY OF TRANSFERRIN: CPT

## 2018-01-15 PROCEDURE — 71046 X-RAY EXAM CHEST 2 VIEWS: CPT | Performed by: INTERNAL MEDICINE

## 2018-01-15 PROCEDURE — 83540 ASSAY OF IRON: CPT

## 2018-01-15 PROCEDURE — 36415 COLL VENOUS BLD VENIPUNCTURE: CPT

## 2018-01-17 ENCOUNTER — LAB ENCOUNTER (OUTPATIENT)
Dept: LAB | Age: 73
End: 2018-01-17
Attending: INTERNAL MEDICINE
Payer: MEDICAID

## 2018-01-17 DIAGNOSIS — D64.9 ANEMIA: Primary | ICD-10-CM

## 2018-01-17 PROCEDURE — 82274 ASSAY TEST FOR BLOOD FECAL: CPT

## 2018-01-20 LAB — HEMOCCULT STL QL: NEGATIVE

## 2018-02-23 ENCOUNTER — PRIOR ORIGINAL RECORDS (OUTPATIENT)
Dept: OTHER | Age: 73
End: 2018-02-23

## 2018-02-23 ENCOUNTER — MYAURORA ACCOUNT LINK (OUTPATIENT)
Dept: OTHER | Age: 73
End: 2018-02-23

## 2018-08-22 LAB
CHOLESTEROL, TOTAL: 70 MG/DL
HDL CHOLESTEROL: 15 MG/DL
LDL CHOLESTEROL: 34 MG/DL
NON-HDL CHOLESTEROL: 55 MG/DL
TRIGLYCERIDES: 103 MG/DL

## 2018-11-13 NOTE — SLP NOTE
SPEECH DAILY NOTE - INPATIENT    Evaluation Date: 05/12/2017    ASSESSMENT & PLAN   ASSESSMENT    Pt seen upright in chair with current diet of mechanical soft/thin liquids (lunch meal with food from home) for monitoring of diet tolerance.   Swallowing prec session(s). Reviewed all swallowing precautions with Pt and son. Pt seen sitting upright in chair and able to self-cue fr controlled amts at slow rate. Consistencies were alternated. No straw was used.       GOAL MET           FOLLOW UP:  Follow Up Neede Self/Spouse

## 2019-02-27 NOTE — PROGRESS NOTES
[facility]    Progress Note    Mara Ortizmarnie Patient Status:  Inpatient    10/17/1945 MRN [id number]   Location [facility] 2W/SW Attending [address] Day # 15 PCP Perlita Love MD, MD     Subjective:     Constit 71 04/20/2017   BILT 0.5 04/20/2017   TP 7.4 04/20/2017   AST 17 04/20/2017   ALT 15* 04/20/2017   PTT 39.0* 04/24/2017   INR 1.7* 04/24/2017   TSH 2.81 04/15/2017   MG 2.4 04/25/2017   PHOS 4.4 04/26/2017   TROP 0.72* 04/17/2017       Xr Chest Ap Portable No

## 2019-02-28 VITALS
HEART RATE: 66 BPM | SYSTOLIC BLOOD PRESSURE: 140 MMHG | DIASTOLIC BLOOD PRESSURE: 60 MMHG | WEIGHT: 162 LBS | RESPIRATION RATE: 18 BRPM | BODY MASS INDEX: 25.43 KG/M2 | HEIGHT: 67 IN

## 2019-06-24 NOTE — PLAN OF CARE
Patient ok for discharge this am per PCP. HHC and outpt dialysis set up per SW note. Accuchecks ACHS. Patient had dialysis this am- 1.5L taken off. Patient to follow up with PCP in 1 week and at the outpatient pulmonary clinic. Please help pt

## 2020-08-14 NOTE — PROGRESS NOTES
[facility]    Progress Note    Lali Verduzco Patient Status:  Inpatient    10/17/1945 MRN [id number]   Location [facility] 3W/SW Attending [address] Day # 3 PCP Crow Monsalve MD, MD     Subjective:   Subjective MSFC scores stable    Recommend PT and SLP     Continue Ocrevus     Added Baclofen for leg and hip stiffness    exertion      P=heparin/integrilin stopped by cardiologist. Tele=nsr.  Julian Maldonado in am.                Results:     Lab Results  Component Value Date   WBC 7.0 04/16/2017   HGB 7.3* 04/16/2017   HCT 22.1* 04/16/2017    04/16/2017   CREATSERUM 5.86* 04

## 2021-03-12 DIAGNOSIS — Z23 NEED FOR VACCINATION: ICD-10-CM

## 2022-10-06 NOTE — PROGRESS NOTES
[place] [facility] - [facility]    Progress Note    Oneyda Livers Patient Status:  Inpatient    10/17/1945 MRN [id number]   Location [facility] 3W/SW Attending Karla Benitez MD   Hosp Day # 10 PCP Chaparro Aguilar MD, MD       Subjective:   Paula Weaver Normal

## 2024-05-04 NOTE — PLAN OF CARE
Problem: Diabetes/Glucose Control  Goal: Glucose maintained within prescribed range  INTERVENTIONS:  - Monitor Blood Glucose as ordered  - Assess for signs and symptoms of hyperglycemia and hypoglycemia  - Administer ordered medications to maintain glucose stability  - Monitor arterial and/or venous puncture sites for bleeding and/or hematoma  - Assess quality of pulses, skin color and temperature  - Assess for signs of decreased coronary artery perfusion - ex.  Angina  - Evaluate fluid balance, assess for ed relaxation techniques  - Monitor for opioid side effects  - Notify MD/LIP if interventions unsuccessful or patient reports new pain   Outcome: Progressing    Problem: SAFETY ADULT - FALL  Goal: Free from fall injury  INTERVENTIONS:  - Assess pt frequently maintained  INTERVENTIONS:  - Monitor labs and assess for signs and symptoms of volume excess or deficit  - Monitor intake, output and patient weight  - Monitor urine specific gravity, serum osmolarity and serum sodium as indicated or ordered  - Monitor re Medical Necessity Information: It is in your best interest to select a reason for this procedure from the list below. All of these items fulfill various CMS LCD requirements except the new and changing color options. Medical Necessity Clause: This procedure was medically necessary because the lesion that was treated was: Lab: 228 Lab Facility: 99 Body Location Override (Optional - Billing Will Still Be Based On Selected Body Map Location If Applicable): Right lateral Abdomen Detail Level: Detailed Was A Bandage Applied: Yes Size Of Lesion In Cm (Required): 1.5 X Size Of Lesion In Cm (Optional): 0 Depth Of Shave: dermis Biopsy Method: Dermablade Anesthesia Type: 1% lidocaine with epinephrine Hemostasis: Drysol Wound Care: Bacitracin Path Notes (To The Dermatopathologist): Size: 1.5 Render Path Notes In Note?: No Consent was obtained from the patient. The risks and benefits to therapy were discussed in detail. Specifically, the risks of infection, scarring, bleeding, prolonged wound healing, incomplete removal, allergy to anesthesia, nerve injury and recurrence were addressed. Prior to the procedure, the treatment site was clearly identified and confirmed by the patient. All components of Universal Protocol/PAUSE Rule completed. Post-Care Instructions: I reviewed with the patient in detail post-care instructions. Patient is to keep the biopsy site dry overnight, and then apply bacitracin twice daily until healed. Patient may apply hydrogen peroxide soaks to remove any crusting. Spontaneous, unlabored and symmetrical Notification Instructions: Patient will be notified of pathology results. However, patient instructed to call the office if not contacted within 2 weeks. Billing Type: Third-Party Bill Body Location Override (Optional - Billing Will Still Be Based On Selected Body Map Location If Applicable): Right Thoracic PV Size Of Lesion In Cm (Required): 0.6 Path Notes (To The Dermatopathologist): Size: 0.6 Body Location Override (Optional - Billing Will Still Be Based On Selected Body Map Location If Applicable): Left Buttock Body Location Override (Optional - Billing Will Still Be Based On Selected Body Map Location If Applicable): Right mid mid Back Size Of Lesion In Cm (Required): 0.3 Path Notes (To The Dermatopathologist): Size:0.3

## (undated) DEVICE — ADAPTER CRDPLG ANTGRD RTRGD 3W

## (undated) DEVICE — SUTURE PROLENE 6-0 C-1

## (undated) DEVICE — CANNULA AORTIC 21 FR SOFT FLOW

## (undated) DEVICE — BLOOD TRANSFUSION FILTER: Brand: HAEMONETICS

## (undated) DEVICE — SUTURE PROLENE 3-0 SH

## (undated) DEVICE — PACK CUSTOM TUBING

## (undated) DEVICE — INSERT SUTURE OPEN HEART

## (undated) DEVICE — GAUZE SPONGES,12 PLY: Brand: CURITY

## (undated) DEVICE — BATTERY

## (undated) DEVICE — SUTURE SILK 1-0 SA87G

## (undated) DEVICE — 3M™ STERI-DRAPE™ INSTRUMENT POUCH 1018: Brand: STERI-DRAPE™

## (undated) DEVICE — ABSORBABLE HEMOSTAT (OXIDIZED REGENERATED CELLULOSE, U.S.P.): Brand: SURGICEL

## (undated) DEVICE — SUTURE MONOCRYL 3-0 Y936H

## (undated) DEVICE — RETROGRADE CARDIOPLEGIA CATHETER: Brand: EDWARDS LIFESCIENCES RETROGRADE CARDIOPLEGIA CATHETER

## (undated) DEVICE — TRAY ENDOVEIN KTV16 HARVESTING

## (undated) DEVICE — SUTURE SILK 0 FSL

## (undated) DEVICE — THORACIC CATHETER, RIGHT ANGLE, SILICONE, WITH CLOTSTOP®: Brand: AXIOM® ATRAUM™ WITH CLOTSTOP®

## (undated) DEVICE — FLOSEAL SEALENT STERILE 10ML

## (undated) DEVICE — SUTURE SURGICAL STEEL #7

## (undated) DEVICE — DEVICE BLWR/MSTR ACCUMIST ATCH

## (undated) DEVICE — PACK ASSRY CUSTOM TUBING

## (undated) DEVICE — PAD PLMM SLVR 12.5X4IN SLVR

## (undated) DEVICE — STERILE POLYISOPRENE POWDER-FREE SURGICAL GLOVES: Brand: PROTEXIS

## (undated) DEVICE — Device: Brand: CDI™ SHUNT SENSOR

## (undated) DEVICE — SUTURE ETHIBOND 0 CT-1

## (undated) DEVICE — SUTURE PDS II 2-0 CT-1

## (undated) DEVICE — GUIDEWIRE MINI STICK 7CM 5F 21

## (undated) DEVICE — LEAD BIPOLAR TEMP 6495XF53

## (undated) DEVICE — HEMOCLIP HORIZON SM MULTI

## (undated) DEVICE — PUNCH AORTIC 4.0 LONG HANDLE

## (undated) DEVICE — SUTURE PROLENE 7-0 8701H

## (undated) DEVICE — CARD SMRT MEDISTEM VERIQ TRNST

## (undated) DEVICE — CANNULA PRFSN 5.5IN 9FR AOR

## (undated) DEVICE — HEART DRAPE & SUPPLY PACK: Brand: MEDLINE INDUSTRIES, INC.

## (undated) DEVICE — THORACIC CATHETER, STRAIGHT, SILICONE, WITH CLOTSTOP®: Brand: AXIOM® ATRAUM™ WITH CLOTSTOP®

## (undated) DEVICE — SUTURE PROLENE 7-0 BV-1

## (undated) DEVICE — CANNULA PRFSN 15IN 32/40FR .5

## (undated) DEVICE — SOL  .9 1000ML BTL

## (undated) DEVICE — SUTURE VICRYL 1-0 J977H

## (undated) DEVICE — SUCTION CANISTER, 3000CC,SAFELINER: Brand: DEROYAL

## (undated) DEVICE — CANNULA PRFSN 2.63IN 3MM 2MM

## (undated) DEVICE — [HIGH FLOW INSUFFLATOR,  DO NOT USE IF PACKAGE IS DAMAGED,  KEEP DRY,  KEEP AWAY FROM SUNLIGHT,  PROTECT FROM HEAT AND RADIOACTIVE SOURCES.]: Brand: PNEUMOSURE

## (undated) DEVICE — 3M™ TEGADERM™ TRANSPARENT FILM DRESSING, 1626W, 4 IN X 4-3/4 IN (10 CM X 12 CM), 50 EACH/CARTON, 4 CARTON/CASE: Brand: 3M™ TEGADERM™

## (undated) DEVICE — CATH SECURING DEVICE STATLOCK

## (undated) DEVICE — CONNECTOR PRFSN QCK PRM .25IN

## (undated) DEVICE — SUTURE SILK 4-0 SA63H

## (undated) DEVICE — SUTURE WIRE DOUBLE STERNOTOMY

## (undated) DEVICE — FORESIGHT LARGE SENSOR: Brand: FORESIGHT

## (undated) DEVICE — STERILE TETRA-FLEX CF, ELASTIC BANDAGE, 4" X 5.5YD: Brand: TETRA-FLEX™CF

## (undated) DEVICE — ALARM PT PTCH LVL

## (undated) DEVICE — HEMOCONCENTRATOR NO RINSE 0.3M

## (undated) DEVICE — SUTURE PROLENE 4-0 BB

## (undated) DEVICE — 12 FOOT DISPOSABLE EXTENSION CABLE WITH SAFE CONNECT / SCREW-DOWN

## (undated) DEVICE — HEART A: Brand: MEDLINE INDUSTRIES, INC.

## (undated) DEVICE — SUTURE PDS II 1 CTX

## (undated) DEVICE — POUCH: SSEAL TYVEK 2000/CS: Brand: MEDICAL ACTION INDUSTRIES

## (undated) DEVICE — SUTURE PROLENE 5-0 C-1

## (undated) DEVICE — INTENDED TO BE USED TO OCCLUDE, RETRACT AND IDENTIFY ARTERIES, VEINS, TENDONS AND NERVES IN SURGICAL PROCEDURES: Brand: STERION®  VESSEL LOOP

## (undated) DEVICE — STABILIZER SRG UNV AST CABG

## (undated) DEVICE — CARTRIDGE HC HMS+ CRTDG SYR

## (undated) DEVICE — COVER CAMERA LIGHT HANDLE

## (undated) DEVICE — SUTURE PROLENE 7-0 BV175-6

## (undated) DEVICE — CS5/5+ FASTPACK, 225ML 150U RES: Brand: HAEMONETICS CELL SAVER 5/5+ SYSTEMS

## (undated) DEVICE — DRAPE SLUSH/WARMER W/DISC

## (undated) DEVICE — STERILE TETRA-FLEX CF, ELASTIC BANDAGE LATEX FREE 6IN X5.5 YD: Brand: TETRA-FLEX™CF

## (undated) DEVICE — COVER SGL STRL LGHT HNDL BLU

## (undated) DEVICE — SUTURE SILK 2-0 SA85H

## (undated) DEVICE — SUTURE SILK 2-0 SH

## (undated) DEVICE — SPONGE LAP 18X18 XRAY STRL

## (undated) DEVICE — SOL  .9 1000ML BAG

## (undated) NOTE — LETTER
[facility]  Administration of Anesthesia  1.    Merle Ledbetter, or _________________________________ acting on his/her behalf, (Patient) (Dependent/Representative) request to receive anesthesia for my pending procedure/operation/treatme pressure, difficulty urinating, slowing of the baby's heart rate and headache. Rare risks include infections, high spinal         block, spinal bleeding, seizure, cardiac arrest and death.   7.   AWARENESS: I understand that it is possible (but unlike ________________________________________________  (Date) (Time)                                                                                                  (Responsible person in case of minor/ unconscious pt) /Relationship    My signature below affir

## (undated) NOTE — LETTER
[address], [place], [place]     AUTHORIZATION FOR SURGICAL OPERATION OR PROCEDURE    1.    I hereby authorize Dr. Benjamin Sifuentes, my Physician(s) and whomever may be designated as the doctor's Assistant, to perform the following to have an autologous transfusion of my own blood, or a directed donor transfusion, I will discuss this with my         Physician.   5.   I consent to the photographing of procedure(s) to be performed for the purposes of advancing medicine, science (Responsible person in case of minor or unconscious patient)   (Relationship to Patient)      _______________________________________________________________ ____________________________  (Witness signature)

## (undated) NOTE — IP AVS SNAPSHOT
Patient Demographics     Address Phone    [address] [address] [phone] (Home) *Preferred*  [phone] [facility])      Emergency Contact(s)     Name Relation Home Work Wesley Dsouza Son [phone]        Allergies as atorvastatin 40 MG Tabs   Last time this was given:  40 mg on 4/27/2017  8:12 PM   Commonly known as:  LIPITOR   Next dose due:  4/28 pm        Take 1 tablet (40 mg total) by mouth nightly.     Poonam Vázquez 5 MG Tbec metoprolol Tartrate 25 MG Tabs   Last time this was given:  12.5 mg on 4/28/2017  5:48 AM   Commonly known as:  LOPRESSOR   Next dose due:  4/28 pm        Take 0.5 tablets (12.5 mg total) by mouth 2x Daily(Beta Blocker).     Ceci Gan [id number] Metoclopramide HCl (REGLAN) injection 10 mg 04/28/17 0053 Given      [id number] Metoclopramide HCl (REGLAN) injection 10 mg 04/28/17 0600 Given      [id number] Metoclopramide HCl (REGLAN) injection 10 mg 04/28/17 1252 Given      [id number] NEPHRO-V Patient's Most Recent Weight       Most Recent Value    Patient Weight 66.134 kg (145 lb 12.8 oz)         Lab Results Last 24 Hours (04/28/17 - 04/27/17)      PROTHROMBIN TIME (PT) [[id number]] (Abnormal)  Resulted: 04/28/17 0927, Result status: Final resul Ordering provider: Melvin Self MD  04/27/17 3479 Resulting lab: [facility]    Specimen Information    Type Source Collected On   Blood  04/28/17 0509          Components       Value Reference Range Flag Lab   Glucose 96 70-99 mg/dL  [facility] MCH 27.9 27.0-32.0 pg  [facility]   MCHC 33.1 32.0-37.0 g/dl  [facility]   RDW 15.5 11.0-15.0 % H [facility]   PLT 97 140-400 K/UL L [facility]   MPV 9.3 7.4-10.3 fL  [facility]            Testing Performed By     The [employer] Name Director [address] [facility], the patient had first set of cardiac enzymes which was elevated and his creatinine was also very high. Usually, his creatinine ranges from 4 to 5 ________.   The patient also had an EKG, which showed ST segment depression and ABDOMEN:  Slightly obese without any tenderness. There is no suprapubic or CVA tenderness. GENITAL/RECTAL:  Deferred. EXTREMITIES:  Trace pedal edema bilaterally without any calf tenderness or tremors. NEUROLOGIC:  Intact without any focal deficits. Date of Consult: 4/19/2017  Reason for Consultation:   Coronary artery disease    History of Present Illness:   Patient is a 66year old male who was admitted to the hospital for Acute chest pain:  He has been having left-sided chest pain on and off for se Sodium Chloride 0.9 % solution      Albumin Human (ALBUMINAR) 25 % solution 100 mL 100 mL Intravenous PRN   iron sucrose (VENOFER) IV Push 200 mg 200 mg Intravenous Daily   Atorvastatin Calcium (LIPITOR) tab 40 mg 40 mg Oral Nightly   Pantoprazole Sodium ( No Known Allergies    Review of Systems:   Constitutional: No fever chills weight loss  HEENT: Denies eye pain, hearing loss, nosebleed  Pulmonary: No cough hemoptysis or wheeze.   Cardiac denies chest pain, syncope, palpitations  GI: No hematemesis melena Neck is soft supple with adequate range of motion no appreciable adenopathy mass or JVD. Lungs are clear to auscultation and percussion. Cardiac exam there is a normal rate and rhythm with a normal S1,S2 and no pathological murmurs.   No rub or extra hear dialysis prior to considering bypass surgery. If he were to develop documented coronary symptoms in the interim then urgent surgery would be warranted.   I discussed the indication techniques risks and alternatives of coronary bypass grafting with the bobbi 2D Echocardiogram Results (HF patients only)    Card Echo 2d Doppler (cpt=93306)    4/14/2017                          * [facility]* -------------------------------------------------------------------------------      Transthoracic Ec and subcostal acoustic windows. Study completion:  The patient tolerated the procedure well. There were no complications. Transthoracic echocardiography. M-mode, complete 2D, complete spectral Doppler, and color Doppler.  Patient birthdate: [birth year] posterior wall thickness, ED                 8.17 mm     ------- IVS/LVPW ratio, ED                             *1.38        <1.3 Ventricular septum Septal thickness, ED                            11.3 mm     ------- Aortic valve Leaflet separation 3 mm Hg  ------- Right ventricle RV pressure, S                                   *45 mm Hg  <30 Legend: Mean values are shown as u=mean value. Asterisk (*) marks values outside specified normal range.  Electronically signed       04/14/2017 16:42 ANNMARIE Pulido within the RW to the bed. The pt required tacitle cues to help facilitate weight shifting and limb advancement during his session due to his fatigue. The pt has a low HGB this AM of 8.1 which may be leading to his fatigue and apparent deconditioning.  The p FUNCTIONAL ABILITY STATUS  Gait Assessment   Gait Assistance:  Moderate assistance  Distance (ft): 3  Assistive Device: Rolling walker  Pattern: Shuffle (poor balance, the pt require cues for RW guidance)  Stoop/Curb Assistance: Not tested  Comment : The pt Current Status           Physical Therapy Note by Lila Benavides PT at 4/27/2017  3:33 PM  Version 1 of 1    Author:  Lila Benavides PT Service:  (none) Author Type:  Physical Therapist    Filed:  4/27/2017  3:34 PM Note Time:  4/27/2017  3:33 PM EOB.  Pt required MinAx1 with scooting towards the EOB once seated. Pt needed verbal cues to not use UE during scooting and sit<>stand. Pt performed sit<>stand with MinAx1 and ambulated 50ft RW with CGA.   Pt ambulated with decreased step length in a [facility] Prior Level of [place]: Prior to admission, pt reports being independent with self-care and ADL's, however, pt does not drive.     SUBJECTIVE  \"I was just sitting up in the chair\"    PHYSICAL THERAPY EXAMINATION     OBJECTIVE  Precautions: Sternal;Ch CMS Modifier (G-Code): CK    FUNCTIONAL ABILITY STATUS  Gait Assessment   Gait Assistance: Other (Comment) (CGA)  Distance (ft): 50  Assistive Device: Rolling walker  Pattern: Shuffle (MANSOOR decreased step length)  Stoop/Curb Assistance: Not tested       Bed Presenting Problem: s/p CABG  Reason for Therapy: Mobility Dysfunction and Discharge Planning    PHYSICAL THERAPY ASSESSMENT     Patient is a 66year old male admitted 4/13/2017 for s/p CABG. Orders received and chart reviewed.   ASHLEIGH Diehl approved Dominga Mcintosh Hyperglycemia    Anemia    Acute on chronic congestive heart failure, unspecified congestive heart failure type ([facility] [address]    Renal failure (ARF), acute on chronic (HCC)    Dyspnea on exertion      Past Medical History  Past Medical History   Diagnosis Date How much difficulty does the patient currently have. ..  -   Turning over in bed (including adjusting bedclothes, sheets and blankets)?: A Little   -   Sitting down on and standing up from a chair with arms (e.g., wheelchair, bedside commode, etc.): A Anatolytl instructions provided to patient in preparation for discharge.    Goal #4   Current Status    Goal #5    Goal #5   Current Status    Goal #6    Goal #6  Current Status                    Occupational Therapy Notes (last 72 hours) (Notes from 4/25/2017  2:36 OT Discharge Recommendations: Cont skilled therapy in a supervised setting;Sub-acute rehabilitation       PLAN  OT Treatment Plan: Balance activities; Energy conservation/work simplification techniques;ADL training;Functional transfer training; Endurance tra Patient will complete bedroom mobility w/ RW and CGA  Comment: mod a    Pt will recall sternal prec independently  Comment:   Goals  on: 17  Frequency: 3-5x/week    [address]  [address]  [address] and was left w/ needs in reach. Pt does live w/ family in a two level home, he will benefit from a stay in a rehab setting prior to d/c home w/ family.  The patient is below baseline and would benefit from skilled inpatient OT to address the above deficits Communication: Pt is able to communicate needs and ask questions.     Behavioral/Emotional/Social: Pt was pleasant and cooperative    RANGE OF MOTION   Upper extremity ROM is within functional limits    STRENGTH ASSESSMENT  Upper extremity strength is withi Patient will complete bedroom mobility w/ RW and CGA  Comment:    Pt will recall sternal prec independently  Comment:           Goals  on: 17  Frequency: 3-5x/week               Video Swallow Study Notes     No notes of this type exist for this

## (undated) NOTE — LETTER
[address], [place]  Authorization for Invasive Procedures  1.  I hereby authorize Dr. Akira Ashley , my physician and whomever may be designated as the doctor's assistant, to perform the following operation and/or procedure:  Thoracent performed for the purposes of advancing medicine, science, and/or education, provided my identity is not revealed. If the procedure has been videotaped, the physician/surgeon will obtain the original videotape.  The hospital will not be responsible for stor My signature below affirms that prior to the time of the procedure, I have explained to the patient and/or his legal representative, the risks and benefits involved in the proposed treatment and any reasonable alternative to the proposed treatment.  I have

## (undated) NOTE — LETTER
[address], [place]  Authorization for Invasive Procedures  1.  I hereby authorize Dr. Dawson Schaefer , my physician and whomever may be designated as the doctor's assistant, to perform the following operation and/or procedure:  CHEST performed for the purposes of advancing medicine, science, and/or education, provided my identity is not revealed. If the procedure has been videotaped, the physician/surgeon will obtain the original videotape.  The hospital will not be responsible for stor My signature below affirms that prior to the time of the procedure, I have explained to the patient and/or his legal representative, the risks and benefits involved in the proposed treatment and any reasonable alternative to the proposed treatment.  I have

## (undated) NOTE — IP AVS SNAPSHOT
[address]  [address] [facility], [facility], [facility] [phone]                Discharge Summary   4/13/2017    Vannesa Brock           Admission Information        Provider Department    4/13/2017 [facility] 2w/Sw Take 1 tablet (5 mg total) by mouth daily as needed for constipation.     Lori Nicole                           Clopidogrel Bisulfate 75 MG Tabs   Last time this was given:  75 mg on 4/28/2017 11:24 AM   Commonly known as:  PLAVIX   Next dose due:  4/2 Take 40 mg by mouth every morning before breakfast.                            glimepiride 1 MG Tabs   Commonly known as:  AMARYL   Next dose due:  4/29 am        Take 1 mg by mouth daily with breakfast.                            isosorbide mononitrate 2 CORONARY ARTERY BYPASS GRAFT SURGERY, DISCHARGE INSTRUCTIONS FOR (ENGLISH)    DIABETES, GENERAL INFORMATION (ENGLISH)    DIABETES, MANAGING (ENGLISH)    HEART FAILURE, CONGESTIVE (ENGLISH)    HEART FAILURE, DISCHARGE INSTRUCTIONS FOR (ENGLISH)      Follow 1.0 (04/28/17)  1.3 (H) (04/28/17)  0.2    (04/27/17)  68 (04/27/17)  8 (04/27/17)  5 (04/27/17)  8 (04/27/17)  0  (04/27/17)  9.1 (H) (04/27/17)  0.9 (L) (04/27/17)  0.6 (04/27/17)  0.9 (H) (04/27/17)  0.0    (04/25/17)  77 (04/25/17)  4 (04/25/17)  2 (04 and ask to get set up for an insurance coverage that is in-network with [facility]. Consult A Doctor     Sign up for Consult A Doctor, your secure online medical record.   Consult A Doctor will allow you to access patient instructions from your recent visit,  view What to report to your healthcare team:  Dizziness, nausea, chest pain, weakness, numbness           Cholesterol Lowering Medications     atorvastatin 40 MG Oral Tab       Use: Lower cholesterol, protect your heart   Most common side effects: Dizziness, co bisacodyl 5 MG Oral Tab EC    Esomeprazole Magnesium 40 MG Oral Capsule Delayed Release       Use:  Nausea/vomiting, acid reflux, low bowel motility, stomach pain   Most common side effects:  Depends on the specific medication but generally include: diarr

## (undated) NOTE — LETTER
[facility], [place]  Authorization for Invasive Procedures  1.  I hereby authorize Dr. Brenda Jane , my physician and whomever may be designated as the doctor's assistant, to perform the following operation and/or procedure: Ultrasound performed for the purposes of advancing medicine, science, and/or education, provided my identity is not revealed. If the procedure has been videotaped, the physician/surgeon will obtain the original videotape.  The hospital will not be responsible for stor My signature below affirms that prior to the time of the procedure, I have explained to the patient and/or his legal representative, the risks and benefits involved in the proposed treatment and any reasonable alternative to the proposed treatment.  I have